# Patient Record
Sex: FEMALE | Race: AMERICAN INDIAN OR ALASKA NATIVE
[De-identification: names, ages, dates, MRNs, and addresses within clinical notes are randomized per-mention and may not be internally consistent; named-entity substitution may affect disease eponyms.]

---

## 2017-05-23 ENCOUNTER — HOSPITAL ENCOUNTER (OUTPATIENT)
Dept: HOSPITAL 5 - MAMMO | Age: 82
Discharge: HOME | End: 2017-05-23
Attending: INTERNAL MEDICINE
Payer: MEDICARE

## 2017-05-23 DIAGNOSIS — Z12.31: Primary | ICD-10-CM

## 2017-05-23 PROCEDURE — 77067 SCR MAMMO BI INCL CAD: CPT

## 2017-05-23 PROCEDURE — G0202 SCR MAMMO BI INCL CAD: HCPCS

## 2017-05-23 NOTE — MAMMOGRAPHY REPORT
Bilateral mammogram: Compared to 5/19/15. CAD study utilized.



Findings:



Predominance of adipose tissue bilaterally. No mass or 

microcalcification. Benign calcifications bilaterally. Benign axillary 

nodes. The there is 3 mm focal ill-defined circumscribed asymmetry 

density  upper left breast.



Impression:



3 mm focal circumscribed ill-defined asymmetry upper left breast. 

Recommend spot mag and sonographic examination. BI-RADS CATEGORY:  0 = 

Needs additional imaging evaluation



ACR BI-RADS MAMMOGRAPHIC CODES:

0 = Needs additional imaging evaluation; 1 = Negative; 2 = Benign; 3 = 

Probably benign; 4 = Suspicious; 5 = Malignant; 6 = Known biopsy-proven 

malignancy



COMMENT:

      1.   Dense breast tissue, i.e., adenosis, fibrocystic    

            changes, etc., may obscure an underlying neoplasm.

      2.   Approximately 10% of cancers are not detected with

            mammography.

      3.   A negative mammography report should not delay biopsy 

            if a clinically suspicious mass is present. COMMENT:

Patient follow-up letters are generated in [employer].



Impression:



Benign findings. Annual followup recommended or

## 2017-06-06 ENCOUNTER — HOSPITAL ENCOUNTER (OUTPATIENT)
Dept: HOSPITAL 5 - MAMMO | Age: 82
Discharge: HOME | End: 2017-06-06
Attending: INTERNAL MEDICINE
Payer: MEDICARE

## 2017-06-06 DIAGNOSIS — R92.8: Primary | ICD-10-CM

## 2017-06-06 DIAGNOSIS — E11.8: ICD-10-CM

## 2017-06-06 DIAGNOSIS — I10: ICD-10-CM

## 2017-06-06 PROCEDURE — 77066 DX MAMMO INCL CAD BI: CPT

## 2017-06-06 PROCEDURE — G0204 DX MAMMO INCL CAD BI: HCPCS

## 2017-06-06 PROCEDURE — 76642 ULTRASOUND BREAST LIMITED: CPT

## 2017-06-06 NOTE — MAMMOGRAPHY REPORT
Spot Compression magnification and 90degree lateral density upper left 

breast measuring approximate 4 mm in diameter followed by sonographic 

examination:



Findings:



On spot compression magnification view 90degree lateral density is 

visualized and has circumscribed margins. No microcalcification.

On sonographic examination hypoechoic well-circumscribed density 

measuring 0.5 in diameter is noted at 1:00 position 7 cm from nipple 

corresponding to the density seen on mammogram.



Impression:



Probably benign density. 6 month followup with mammogram and sonogram 

recommended. BI-RADS CATEGORY:  3 = Probably benign



ACR BI-RADS MAMMOGRAPHIC CODES:

0 = Needs additional imaging evaluation; 1 = Negative; 2 = Benign; 3 = 

Probably benign; 4 = Suspicious; 5 = Malignant; 6 = Known biopsy-proven 

malignancy



COMMENT:

      1.   Dense breast tissue, i.e., adenosis, fibrocystic 

            changes, etc., may obscure an underlying neoplasm.

      2.   Approximately 10% of cancers are not detected with

            mammography.

      3.   A negative mammography report should not delay biopsy 

            if a clinically suspicious mass is present.

## 2017-10-31 ENCOUNTER — HOSPITAL ENCOUNTER (OUTPATIENT)
Dept: HOSPITAL 5 - US | Age: 82
Discharge: HOME | End: 2017-10-31
Attending: INTERNAL MEDICINE
Payer: MEDICARE

## 2017-10-31 DIAGNOSIS — N63.20: Primary | ICD-10-CM

## 2017-10-31 NOTE — MAMMOGRAPHY REPORT
LEFT DIGITAL DIAGNOSTIC MAMMOGRAM with CAD: 10/31/17 10:44:00



CLINICAL: Follow-up of a probably benign asymmetry.



COMPARISON:06/06/16



FINDINGS: The breast is heterogeneously dense, which may obscure small 

masses.  The previously described upper outer asymmetry is a lymph node 

with a fatty hilum.  The fatty hilum is more prominent than on prior 

exams.  No mass, architectural distortion or suspicious calcifications. 

 







IMPRESSION: No mammographic evidence of malignancy.A benign upper outer 

intraparenchymal lymph node and no need for breast ultrasound.



BI-RADS CATEGORY:  2 -- Benign



RECOMMENDATION: Return to routine mammographic screening.



ACR BI-RADS MAMMOGRAPHIC CODES:

0 = Needs additional imaging evaluation; 1 = Negative; 2 = Benign; 3 = 

Probably benign; 4 = Suspicious; 5 = Malignant; 6 = Known biopsy-proven 

malignancy



COMMENT:

      1.   Dense breast tissue, i.e., adenosis, fibrocystic 

            changes, etc., may obscure an underlying neoplasm.

      2.   Approximately 10% of cancers are not detected with

            mammography.

      3.   A negative mammography report should not delay biopsy 

            if a clinically suspicious mass is present.





COMMENT:

Patient follow-up letters are generated by our  [employer] application.

## 2018-05-25 ENCOUNTER — HOSPITAL ENCOUNTER (OUTPATIENT)
Dept: HOSPITAL 5 - SPVWC | Age: 83
Discharge: HOME | End: 2018-05-25
Attending: SURGERY
Payer: MEDICARE

## 2018-05-25 DIAGNOSIS — E78.00: ICD-10-CM

## 2018-05-25 DIAGNOSIS — I10: ICD-10-CM

## 2018-05-25 DIAGNOSIS — Z90.710: ICD-10-CM

## 2018-05-25 DIAGNOSIS — Z12.31: Primary | ICD-10-CM

## 2018-05-25 DIAGNOSIS — J45.909: ICD-10-CM

## 2018-05-25 PROCEDURE — 77067 SCR MAMMO BI INCL CAD: CPT

## 2018-05-26 NOTE — MAMMOGRAPHY REPORT
Screening mammogram:



Routine views are compared to multiple exams dating back to 2014. The 

left breast pattern has remained stable. No significant findings. The 

patient has had right breast surgery. There is a focal asymmetry in the 

superior right breast which is slightly more prominent than on prior 

exam in 2017 and not seen prior to that. The findings in the right 

breast are not otherwise changed or unremarkable from prior exams.



CAD used.



Impression:



Right breast asymmetry.



Recommendation:



Additional spot compression imaging of the right breast and ultrasound 

as needed.



BI-RADS CATEGORY:  0 = Needs additional imaging evaluation



ACR BI-RADS MAMMOGRAPHIC CODES:

0 = Needs additional imaging evaluation; 1 = Negative; 2 = Benign; 3 = 

Probably benign; 4 = Suspicious; 5 = Malignant; 6 = Known biopsy-proven 

malignancy



COMMENT:

      1.   Dense breast tissue, i.e., adenosis, fibrocystic    

            changes, etc., may obscure an underlying neoplasm.

      2.   Approximately 10% of cancers are not detected with

            mammography.

      3.   A negative mammography report should not delay biopsy 

            if a clinically suspicious mass is present.

## 2018-06-12 ENCOUNTER — HOSPITAL ENCOUNTER (OUTPATIENT)
Dept: HOSPITAL 5 - SPVWC | Age: 83
Discharge: HOME | End: 2018-06-12
Attending: SURGERY
Payer: MEDICARE

## 2018-06-12 DIAGNOSIS — Z90.710: ICD-10-CM

## 2018-06-12 DIAGNOSIS — I10: ICD-10-CM

## 2018-06-12 DIAGNOSIS — N60.41: ICD-10-CM

## 2018-06-12 DIAGNOSIS — E11.9: ICD-10-CM

## 2018-06-12 DIAGNOSIS — N60.01: Primary | ICD-10-CM

## 2018-06-12 DIAGNOSIS — E78.00: ICD-10-CM

## 2018-06-12 NOTE — MAMMOGRAPHY REPORT
RIGHT DIGITAL DIAGNOSTIC MAMMOGRAM and RIGHT BREAST ULTRASOUND: 

06/12/18 09:32:00



CLINICAL: Recalled for asymmetry.



COMPARISON:05/25/18 screening



FINDINGS: Additional mammographic views were performed and are negative.



Ultrasound of the right breast (including all four quadrants and the 

retroareolar area) was performed and demonstrated a few dilated ducts 

and a few small cysts.  A 3 x 2 x 1 mm cyst at 10 o'clock 8 cm from the 

nipple, a 3 x 2 x 2 mm cyst at 12 o'clock 3 cm from the nipple and a 

subareolar cyst at 2 o'clock measuring 4 x 2 x 4 mm.  No solid mass or 

shadowing.





IMPRESSION: Mild benign duct ectasia and a few tiny benign cysts.



BI-RADS CATEGORY:  2 - - Benign



RECOMMENDATION: Routine mammographic screening in one year.



ACR BI-RADS MAMMOGRAPHIC CODES:

0 = Needs additional imaging evaluation; 1 = Negative; 2 = Benign; 3 = 

Probably benign; 4 = Suspicious; 5 = Malignant; 6 = Known biopsy-proven 

malignancy



COMMENT:

      1.   Dense breast tissue, i.e., adenosis, fibrocystic 

            changes, etc., may obscure an underlying neoplasm.

      2.   Approximately 10% of cancers are not detected with

            mammography.

      3.   A negative mammography report should not delay biopsy 

            if a clinically suspicious mass is present.





COMMENT:

Patient follow-up letters are generated via our [email or web address] 

application.

## 2018-06-12 NOTE — ULTRASOUND REPORT
RIGHT DIGITAL DIAGNOSTIC MAMMOGRAM and RIGHT BREAST ULTRASOUND: 

06/12/18 09:32:00



CLINICAL: Recalled for asymmetry.



COMPARISON:05/25/18 screening



FINDINGS: Additional mammographic views were performed and are negative.



Ultrasound of the right breast (including all four quadrants and the 

retroareolar area) was performed and demonstrated a few dilated ducts 

and a few small cysts.  A 3 x 2 x 1 mm cyst at 10 o'clock 8 cm from the 

nipple, a 3 x 2 x 2 mm cyst at 12 o'clock 3 cm from the nipple and a 

subareolar cyst at 2 o'clock measuring 4 x 2 x 4 mm.  No solid mass or 

shadowing.





IMPRESSION: Mild benign duct ectasia and a few tiny benign cysts.



BI-RADS CATEGORY:  2 - - Benign



RECOMMENDATION: Routine mammographic screening in one year.



ACR BI-RADS MAMMOGRAPHIC CODES:

0 = Needs additional imaging evaluation; 1 = Negative; 2 = Benign; 3 = 

Probably benign; 4 = Suspicious; 5 = Malignant; 6 = Known biopsy-proven 

malignancy



COMMENT:

      1.   Dense breast tissue, i.e., adenosis, fibrocystic 

            changes, etc., may obscure an underlying neoplasm.

      2.   Approximately 10% of cancers are not detected with

            mammography.

      3.   A negative mammography report should not delay biopsy 

            if a clinically suspicious mass is present.





COMMENT:

Patient follow-up letters are generated via our [employer] 

application.

## 2019-11-20 ENCOUNTER — HOSPITAL ENCOUNTER (EMERGENCY)
Dept: HOSPITAL 5 - ED | Age: 84
Discharge: HOME | End: 2019-11-20
Payer: COMMERCIAL

## 2019-11-20 VITALS — SYSTOLIC BLOOD PRESSURE: 158 MMHG | DIASTOLIC BLOOD PRESSURE: 63 MMHG

## 2019-11-20 DIAGNOSIS — Z87.442: ICD-10-CM

## 2019-11-20 DIAGNOSIS — I25.2: ICD-10-CM
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
## 2019-11-20 DIAGNOSIS — Z88.0: ICD-10-CM

## 2019-11-20 DIAGNOSIS — I10: ICD-10-CM

## 2019-11-20 DIAGNOSIS — Z79.899: ICD-10-CM
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
## 2019-11-20 DIAGNOSIS — S83.92XA: Primary | ICD-10-CM
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
## 2019-11-20 DIAGNOSIS — E78.00: ICD-10-CM

## 2019-11-20 DIAGNOSIS — Z98.890: ICD-10-CM

## 2019-11-20 DIAGNOSIS — Z90.710: ICD-10-CM

## 2019-11-20 DIAGNOSIS — E11.9: ICD-10-CM

## 2019-11-20 DIAGNOSIS — Z88.2: ICD-10-CM

## 2019-11-20 DIAGNOSIS — Z88.6: ICD-10-CM
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
## 2019-11-20 NOTE — XRAY REPORT
LEFT KNEE 3 VIEW(S)



INDICATION / CLINICAL INFORMATION:

PAIN/SWELLING R/T FALL



COMPARISON:

None available.

 

FINDINGS:



BONES / JOINT(S): No acute fracture or subluxation. Mild patellofemoral degenerative arthrosis. No si
gnificant joint effusion or intra-articular body.



SOFT TISSUES: Mild anterior soft tissue swelling.



ADDITIONAL FINDINGS: None.







Signer Name: JESSY Narayan MD 

Signed: 11/20/2019 5:12 PM

 Workstation Name: [id number]

## 2019-11-20 NOTE — EMERGENCY DEPARTMENT REPORT
ED Extremity Problem HPI





- General


Chief complaint: Pain General


Stated complaint: LT KNEE INJURY


Time Seen by Provider: 19 17:50


Source: patient


Mode of arrival: Ambulatory


Limitations: No Limitations





- History of Present Illness


Initial comments: 





THis is a pleasant 87 yo AAF who presents to the Ed with a chief complaint of 

left knee pain after a fall one week prior. She reports she was walking in her 

bathroom and slipped on wet tile and while falling she twisted her left knee. 

She denies any other injuries. NO head injury or LOC. No syncope, pre syncope, 

chest pain, SOB, weakness, fever, chills, night sweats or any other related 

symptoms. Pain is a 8/10 with movement. immobility alleviates the pain. no 

radaiteing pain. Pain is located to the lateral left knee. 


MD Complaint: extremity pain


Onset/Timin


-: Gradual, week(s)


Location: lower extremity


-: No myalgia, No fever, No associated dyspnea, No associated chest pain


Quality: stabbing, sharp


Consistency: intermittent


Improves with: immobilization


Worsens with: weight bearing, walking





- Related Data


                                Home Medications











 Medication  Instructions  Recorded  Confirmed  Last Taken


 


Aspirin [Aspirin BABY CHEW TAB] 162 mg PO QDAY 08/26/14 10/15/18 Unknown


 


Simvastatin 20 mg PO QDAY 08/26/14 10/15/18 Unknown


 


Sitagliptin Phosphate [Januvia] 100 mg PO DAILY 08/26/14 10/15/18 Unknown








                                  Previous Rx's











 Medication  Instructions  Recorded  Last Taken  Type


 


Ciprofloxacin [Ciprofloxacin ORAL 500 mg PO BID #8 [facility]..rec 10/16/18 Unknown 

Rx





LIQ]    


 


amLODIPine 5 mg PO QDAY #30 tablet 10/16/18 Unknown Rx


 


amLODIPine 10 mg PO DAILY #30 tab 10/16/18 Unknown Rx


 


ALBUTEROL Inhaler(NF) [VENTOLIN 1 puff IH Q4HR PRN #1 inha 18 Unknown Rx





Inhaler(NF)]    


 


Benzonatate [Tessalon Perles] 100 mg PO Q8HR PRN #20 capsule 18 Unknown Rx


 


Meloxicam [Mobic] 7.5 mg PO QDAY 7 Days #7 tablet 19 Unknown Rx











                                    Allergies











Allergy/AdvReac Type Severity Reaction Status Date / Time


 


Penicillins Allergy  Swelling Verified 14 23:30


 


Sulfa (Sulfonamide Allergy  Rash Verified 14 23:30





Antibiotics)     


 


tramadol Allergy  Rash Unverified 16 11:15














ED Review of Systems


ROS: 


Stated complaint: LT KNEE INJURY


Other details as noted in HPI





Comment: All other systems reviewed and negative


Constitutional: denies: chills, fever


Eyes: denies: eye pain, eye discharge, vision change


ENT: denies: ear pain, throat pain


Respiratory: denies: cough, shortness of breath, wheezing


Cardiovascular: denies: chest pain, palpitations


Endocrine: no symptoms reported


Gastrointestinal: denies: abdominal pain, nausea, diarrhea


Genitourinary: denies: urgency, dysuria, discharge


Musculoskeletal: as per HPI, arthralgia.  denies: back pain, joint swelling


Skin: denies: rash, lesions


Neurological: denies: headache, weakness, paresthesias


Psychiatric: denies: anxiety, depression


Hematological/Lymphatic: denies: easy bleeding, easy bruising





ED Past Medical Hx





- Past Medical History


Previous Medical History?: Yes


Hx Hypertension: Yes


Hx Heart Attack/AMI: No


Hx Diabetes: Yes


Hx Kidney Stones: Yes


Hx Asthma: Yes


Additional medical history: high cholesterol, cyst on kidney





- Surgical History


Past Surgical History?: Yes


Additional Surgical History: Hysterectomy, thyroid surgery, surgery to left toe,

 left foot, left elbow, left hand, intra Ductal Papilloma, Dysplasia under 

tongue, cataract surgery, vein stripping, parathyroidectomy





- Social History


Smoking Status: Never Smoker


Substance Use Type: None





- Medications


Home Medications: 


                                Home Medications











 Medication  Instructions  Recorded  Confirmed  Last Taken  Type


 


Aspirin [Aspirin BABY CHEW TAB] 162 mg PO QDAY 08/26/14 10/15/18 Unknown History


 


Simvastatin 20 mg PO QDAY 08/26/14 10/15/18 Unknown History


 


Sitagliptin Phosphate [Januvia] 100 mg PO DAILY 08/26/14 10/15/18 Unknown 

History


 


Ciprofloxacin [Ciprofloxacin ORAL 500 mg PO BID #8 [facility]..rec 10/16/18  Unknown 

Rx





LIQ]     


 


amLODIPine 5 mg PO QDAY #30 tablet 10/16/18  Unknown Rx


 


amLODIPine 10 mg PO DAILY #30 tab 10/16/18  Unknown Rx


 


ALBUTEROL Inhaler(NF) [VENTOLIN 1 puff IH Q4HR PRN #1 inha 18  Unknown Rx





Inhaler(NF)]     


 


Benzonatate [Tessalon Perles] 100 mg PO Q8HR PRN #20 capsule 18  Unknown 

Rx


 


Meloxicam [Mobic] 7.5 mg PO QDAY 7 Days #7 tablet 19  Unknown Rx














ED Physical Exam





- General


Limitations: No Limitations


General appearance: alert, in no apparent distress





- Head


Head exam: Present: atraumatic, normocephalic





- Eye


Eye exam: Present: normal appearance





- ENT


ENT exam: Present: mucous membranes moist





- Neck


Neck exam: Present: normal inspection





- Respiratory


Respiratory exam: Present: normal lung sounds bilaterally.  Absent: respiratory 

distress





- Cardiovascular


Cardiovascular Exam: Present: regular rate, normal rhythm.  Absent: systolic 

murmur, diastolic murmur, rubs, gallop





- GI/Abdominal


GI/Abdominal exam: Present: soft, normal bowel sounds





- Extremities Exam


Extremities exam: Present: normal inspection, tenderness





- Expanded Lower Extremity Exam


  ** Left


Knee exam: Present: normal inspection, full ROM, tenderness (TTP over lateral 

left knee with pain on varus strain, no pain with valgus strain, no laxity with 

either, negatie anterior/posterior drawer sign. no posterior calf tenderness, 

negative priscilla sign bilaterally. normal distal sensation and cap refill. normal 

DP/PT pulses. )





- Back Exam


Back exam: Present: normal inspection





- Neurological Exam


Neurological exam: Present: alert, oriented X3





- Psychiatric


Psychiatric exam: Present: normal affect, normal mood





- Skin


Skin exam: Present: warm, dry, intact, normal color.  Absent: rash





ED Course





                                   Vital Signs











  19





  15:43


 


Temperature 97.9 F


 


Pulse Rate 67


 


Respiratory 18





Rate 


 


Blood Pressure 140/59


 


O2 Sat by Pulse 100





Oximetry 














ED Medical Decision Making





- Radiology Data


Radiology results: report reviewed, image reviewed





LEFT KNEE 3 VIEW(S) INDICATION / CLINICAL INFORMATION: PAIN/SWELLING R/T FALL 

COMPARISON: None available. FINDINGS: BONES / JOINT(S): No acute fracture or 

subluxation. Mild patellofemoral degenerative arthrosis. No significant joint 

effusion or intra-articular body. SOFT TISSUES: Mild anterior soft tissue 

swelling. ADDITIONAL FINDINGS: None. Signer Name: JESSY Narayan MD Signed: 

2019 5:12 PM Workstation Name: [id number] Transcribed By: DT Dictated By:

 Amandeep Narayan MD Electronically Authenticated By: Amandeep Narayan MD 

Signed Date/Time: 19 171 





- Medical Decision Making





patient presented with left knee pain after twisting injury. x-ray is negative 

for fractures or acute bony abnormalities as read by radiology. I suspect LCL 

injury due to pain with varus strain of the knee. No laxity on exam. ACE 

banadage applied by RN and supervised by me. Normal neuro and vascular exam pre 

and post application. patient given follow up wiht orthpedics and return 

precautions for changing or worsening symptoms. Low wells score for DVT and no 

symptoms of DVT on exam. No additional injuries. 





- Differential Diagnosis


fracture, sprain, strain 


Critical care attestation.: 


If time is entered above; I have spent that time in minutes in the direct care 

of this critically ill patient, excluding procedure time.








ED Disposition


Clinical Impression: 


Left knee sprain


Qualifiers:


 Encounter type: initial encounter Involved ligament of knee: lateral collateral

 ligament Qualified Code(s): S83.422A - Sprain of lateral collateral ligament of

 left knee, initial encounter





Disposition:  TO HOME OR SELFCARE


Is pt being admited?: No


Does the pt Need Aspirin: No


Condition: Stable


Instructions:  Knee Sprain (ED)


Prescriptions: 


Meloxicam [Mobic] 7.5 mg PO QDAY 7 Days #7 tablet


Referrals: 


REX LOWE MD [Staff Physician] - 3-5 Days


Time of Disposition: 18:45

## 2021-02-22 ENCOUNTER — HOSPITAL ENCOUNTER (OUTPATIENT)
Dept: HOSPITAL 5 - ED | Age: 86
Setting detail: OBSERVATION
LOS: 1 days | Discharge: HOME | End: 2021-02-23
Attending: INTERNAL MEDICINE | Admitting: INTERNAL MEDICINE
Payer: MEDICARE

## 2021-02-22 DIAGNOSIS — N17.9: ICD-10-CM

## 2021-02-22 DIAGNOSIS — Z79.82: ICD-10-CM

## 2021-02-22 DIAGNOSIS — Z87.442: ICD-10-CM

## 2021-02-22 DIAGNOSIS — E87.6: ICD-10-CM

## 2021-02-22 DIAGNOSIS — I20.0: Primary | ICD-10-CM

## 2021-02-22 DIAGNOSIS — Z90.710: ICD-10-CM

## 2021-02-22 DIAGNOSIS — Z98.890: ICD-10-CM

## 2021-02-22 DIAGNOSIS — E11.22: ICD-10-CM

## 2021-02-22 DIAGNOSIS — I12.9: ICD-10-CM

## 2021-02-22 DIAGNOSIS — Z98.49: ICD-10-CM

## 2021-02-22 DIAGNOSIS — N18.32: ICD-10-CM

## 2021-02-22 DIAGNOSIS — J45.909: ICD-10-CM

## 2021-02-22 DIAGNOSIS — R07.89: ICD-10-CM

## 2021-02-22 LAB
ALBUMIN SERPL-MCNC: 4.3 G/DL (ref 3.9–5)
ALT SERPL-CCNC: 13 UNITS/L (ref 7–56)
APTT BLD: 27.1 SEC. (ref 24.2–36.6)
BAND NEUTROPHILS # (MANUAL): 0 K/MM3
BUN SERPL-MCNC: 32 MG/DL (ref 7–17)
BUN/CREAT SERPL: 18 %
CALCIUM SERPL-MCNC: 10.8 MG/DL (ref 8.4–10.2)
HCT VFR BLD CALC: 38.3 % (ref 30.3–42.9)
HEMOLYSIS INDEX: 5
HGB BLD-MCNC: 12.6 GM/DL (ref 10.1–14.3)
INR PPP: 0.94 (ref 0.87–1.13)
MCHC RBC AUTO-ENTMCNC: 33 % (ref 30–34)
MCV RBC AUTO: 105 FL (ref 79–97)
MYELOCYTES # (MANUAL): 0 K/MM3
PLATELET # BLD: 227 K/MM3 (ref 140–440)
PROMYELOCYTES # (MANUAL): 0 K/MM3
RBC # BLD AUTO: 3.64 M/MM3 (ref 3.65–5.03)
TOTAL CELLS COUNTED BLD: 100

## 2021-02-22 PROCEDURE — 36415 COLL VENOUS BLD VENIPUNCTURE: CPT

## 2021-02-22 PROCEDURE — 85025 COMPLETE CBC W/AUTO DIFF WBC: CPT

## 2021-02-22 PROCEDURE — G0378 HOSPITAL OBSERVATION PER HR: HCPCS

## 2021-02-22 PROCEDURE — 82550 ASSAY OF CK (CPK): CPT

## 2021-02-22 PROCEDURE — 82553 CREATINE MB FRACTION: CPT

## 2021-02-22 PROCEDURE — 83880 ASSAY OF NATRIURETIC PEPTIDE: CPT

## 2021-02-22 PROCEDURE — 85007 BL SMEAR W/DIFF WBC COUNT: CPT

## 2021-02-22 PROCEDURE — 85610 PROTHROMBIN TIME: CPT

## 2021-02-22 PROCEDURE — 96372 THER/PROPH/DIAG INJ SC/IM: CPT

## 2021-02-22 PROCEDURE — 96375 TX/PRO/DX INJ NEW DRUG ADDON: CPT

## 2021-02-22 PROCEDURE — 71046 X-RAY EXAM CHEST 2 VIEWS: CPT

## 2021-02-22 PROCEDURE — 80048 BASIC METABOLIC PNL TOTAL CA: CPT

## 2021-02-22 PROCEDURE — 80053 COMPREHEN METABOLIC PANEL: CPT

## 2021-02-22 PROCEDURE — 93017 CV STRESS TEST TRACING ONLY: CPT

## 2021-02-22 PROCEDURE — 85730 THROMBOPLASTIN TIME PARTIAL: CPT

## 2021-02-22 PROCEDURE — 97165 OT EVAL LOW COMPLEX 30 MIN: CPT

## 2021-02-22 PROCEDURE — 93005 ELECTROCARDIOGRAM TRACING: CPT

## 2021-02-22 PROCEDURE — 84484 ASSAY OF TROPONIN QUANT: CPT

## 2021-02-22 PROCEDURE — 99291 CRITICAL CARE FIRST HOUR: CPT

## 2021-02-22 PROCEDURE — A9502 TC99M TETROFOSMIN: HCPCS

## 2021-02-22 PROCEDURE — 96374 THER/PROPH/DIAG INJ IV PUSH: CPT

## 2021-02-22 PROCEDURE — 97161 PT EVAL LOW COMPLEX 20 MIN: CPT

## 2021-02-22 PROCEDURE — 78452 HT MUSCLE IMAGE SPECT MULT: CPT

## 2021-02-22 NOTE — EVENT NOTE
ED Screening Note


ED Screening Note: 





left sided CP that began a couple days ago 


states it feels like a pressure


no n/v/d


no fever


no cough


no SOB 


+leg swelling 


no diaphoresis


no radiation of the pain 


PMHx HTN, osteoporesis, HLD, pre-diabetes 


allergy: pencillin, sulfa, tramadol 


never been a smoker





This initial assessment/diagnostic orders/clinical plan/treatment(s) is/are 

subject to change based on patients health status, clinical progression and re-

assessment by fellow clinical providers in the ED. Further treatment and workup 

at subsequent clinical providers discretion. Patient/guardian urged not to elope

from the ED as their condition may be serious if not clinically assessed and 

managed. 





Initial orders include: 


CP protocol

## 2021-02-22 NOTE — EMERGENCY DEPARTMENT REPORT
ED Chest Pain HPI





- General


Chief Complaint: Chest Pain


Stated Complaint: CHEST PAIN


Time Seen by Provider: 02/22/21 21:23


Source: patient


Mode of arrival: Ambulatory


Limitations: No Limitations





- History of Present Illness


Initial Comments: 





Patient is 87 years old female with history of hypertension, diabetes and 

hyperlipidemia.  Patient presented to the ER complaining of left sided chest 

pain, tightness, 4 out of 10 with no radiation.  No relieving or aggravating 

factors.  Patient stated that she never had any pain like this before.  Patient 

denied any shortness of breath, fever, chills or cough.


MD Complaint: chest pain


-: days(s)


Onset: during rest


Pain Location: left chest


Severity scale (0 -10): 4


Quality: tightness





- Related Data


                                Home Medications











 Medication  Instructions  Recorded  Confirmed  Last Taken


 


Aspirin [Aspirin BABY CHEW TAB] 162 mg PO QDAY 08/26/14 10/15/18 Unknown


 


Simvastatin 20 mg PO QDAY 08/26/14 10/15/18 Unknown


 


Sitagliptin Phosphate [Januvia] 100 mg PO DAILY 08/26/14 10/15/18 Unknown








                                  Previous Rx's











 Medication  Instructions  Recorded  Last Taken  Type


 


Ciprofloxacin [Ciprofloxacin ORAL 500 mg PO BID #8 [facility]..rec 10/16/18 Unknown 

Rx





LIQ]    


 


amLODIPine 5 mg PO QDAY #30 tablet 10/16/18 Unknown Rx


 


amLODIPine 10 mg PO DAILY #30 tab 10/16/18 Unknown Rx


 


ALBUTEROL Inhaler(NF) [VENTOLIN 1 puff IH Q4HR PRN #1 inha 12/29/18 Unknown Rx





Inhaler(NF)]    


 


Benzonatate [Tessalon Perles] 100 mg PO Q8HR PRN #20 capsule 12/29/18 Unknown Rx


 


Meloxicam [Mobic] 7.5 mg PO QDAY 7 Days #7 tablet 11/20/19 Unknown Rx











                                    Allergies











Allergy/AdvReac Type Severity Reaction Status Date / Time


 


Penicillins Allergy  Swelling Verified 04/28/14 23:30


 


Sulfa (Sulfonamide Allergy  Rash Verified 04/28/14 23:30





Antibiotics)     


 


tramadol Allergy  Rash Unverified 05/19/16 11:15














Heart Score





- HEART Score


History: Moderately suspicious


EKG: Non-specific


Age: > 65


Risk factors: > 3 risk factors or hx of atherosclerotic disease


Troponin: < normal limit


HEART Score: 6





ED Review of Systems


ROS: 


Stated complaint: CHEST PAIN


Other details as noted in HPI





Comment: All other systems reviewed and negative


Constitutional: denies: chills, fever


Respiratory: denies: cough, shortness of breath, SOB with exertion, SOB at rest,

 wheezing


Cardiovascular: chest pain


Gastrointestinal: denies: abdominal pain, nausea, vomiting, diarrhea, 

constipation, hematemesis, melena, hematochezia


Musculoskeletal: denies: back pain


Neurological: denies: headache, weakness, numbness, paresthesias, confusion





ED Past Medical Hx





- Past Medical History


Hx Hypertension: Yes


Hx Heart Attack/AMI: No


Hx Diabetes: Yes


Hx Kidney Stones: Yes


Hx Asthma: Yes


Additional medical history: high cholesterol, cyst on kidney





- Surgical History


Additional Surgical History: Hysterectomy, thyroid surgery, surgery to left toe,

 left foot, left elbow, left hand, intra Ductal Papilloma, Dysplasia under 

tongue, cataract surgery, vein stripping, parathyroidectomy





- Social History


Smoking Status: Never Smoker


Substance Use Type: None





- Medications


Home Medications: 


                                Home Medications











 Medication  Instructions  Recorded  Confirmed  Last Taken  Type


 


Aspirin [Aspirin BABY CHEW TAB] 162 mg PO QDAY 08/26/14 10/15/18 Unknown History


 


Simvastatin 20 mg PO QDAY 08/26/14 10/15/18 Unknown History


 


Sitagliptin Phosphate [Januvia] 100 mg PO DAILY 08/26/14 10/15/18 Unknown Histor

y


 


Ciprofloxacin [Ciprofloxacin ORAL 500 mg PO BID #8 [facility]..rec 10/16/18  Unknown 

Rx





LIQ]     


 


amLODIPine 5 mg PO QDAY #30 tablet 10/16/18  Unknown Rx


 


amLODIPine 10 mg PO DAILY #30 tab 10/16/18  Unknown Rx


 


ALBUTEROL Inhaler(NF) [VENTOLIN 1 puff IH Q4HR PRN #1 inha 12/29/18  Unknown Rx





Inhaler(NF)]     


 


Benzonatate [Tessalon Perles] 100 mg PO Q8HR PRN #20 capsule 12/29/18  Unknown 

Rx


 


Meloxicam [Mobic] 7.5 mg PO QDAY 7 Days #7 tablet 11/20/19  Unknown Rx














ED Physical Exam





- General


Limitations: No Limitations


General appearance: alert, in no apparent distress





- Head


Head exam: Present: atraumatic, normocephalic, normal inspection





- Eye


Eye exam: Present: normal appearance





- ENT


ENT exam: Present: normal exam, normal orophraynx, mucous membranes moist





- Neck


Neck exam: Present: normal inspection, full ROM.  Absent: tenderness, m

eningismus





- Respiratory


Respiratory exam: Present: normal lung sounds bilaterally





- Cardiovascular


Cardiovascular Exam: Present: regular rate, normal rhythm, normal heart sounds





- GI/Abdominal


GI/Abdominal exam: Present: soft, normal bowel sounds.  Absent: distended, 

tenderness, guarding, rebound, rigid, organomegaly, mass, bruit, pulsatile mass,

 hernia





- Extremities Exam


Extremities exam: Present: normal inspection, full ROM, normal capillary refill.

  Absent: tenderness, pedal edema, joint swelling, calf tenderness





- Back Exam


Back exam: Present: normal inspection, full ROM.  Absent: CVA tenderness (R), 

CVA tenderness (L)





- Neurological Exam


Neurological exam: Present: alert, oriented X3, CN II-XII intact, normal gait, 

reflexes normal





- Psychiatric


Psychiatric exam: Present: normal mood





- Skin


Skin exam: Present: warm, intact, normal color





ED Course





                                   Vital Signs











  02/22/21





  21:19


 


Temperature 98.0 F


 


Pulse Rate 64


 


Respiratory 14





Rate 


 


Blood Pressure 184/56


 


O2 Sat by Pulse 97





Oximetry 














GUIDO score





- Guido Score


Age > 65: (1) Yes


Aspirin use within the Past 7 Days: (1) Yes


3 or more CAD Risk Factors: (1) Yes


2 or more Angina events in past 24 hrs: (1) Yes


Known CAD with more than 50% Stenosis: (0) No


Elevated Cardiac Markers: (0) No


ST Deviation Greater than 0.5mm: (0) No


GUIDO Score: 4





ED Medical Decision Making





- Lab Data


Result diagrams: 


                                 02/22/21 21:27





                                 02/22/21 21:27





- EKG Data


EKG shows normal: sinus rhythm





- Radiology Data


Radiology results: report reviewed





- Medical Decision Making





Patient is 87 years old female with history of hypertension, diabetes and 

hyperlipidemia.  Patient presented to the ER complaining of left sided chest 

pain, tightness, 4 out of 10 with no radiation.  No relieving or aggravating 

factors.  Patient stated that she never had any pain like this before.  Patient 

denied any shortness of breath, fever, chills or cough.





EKG is unremarkable.  Labs reviewed and is unremarkable including first set of 

troponin.  Chest x-ray showed no acute abnormalities.  Patient chest pain is 

typical.  Patient received aspirin 324 mg in the ER.  I discussed the patient 

with Dr. Temple, he agreed to admit the patient to medical service for further 

management.


Critical Care Time: Yes


Critical care time in (mins) excluding proc time.: 30


Critical care attestation.: 


If time is entered above; I have spent that time in minutes in the direct care 

of this critically ill patient, excluding procedure time.








ED Disposition


Clinical Impression: 


 Unstable angina





Disposition: DC-09 OP ADMIT IP TO THIS HOSP


Is pt being admited?: Yes


Condition: Stable


Instructions:  Angina (ED)


Referrals: 


PRIMARY CARE,MD [Primary Care Provider] - 3-5 Days

## 2021-02-22 NOTE — XRAY REPORT
CHEST 2 VIEWS 



INDICATION / CLINICAL INFORMATION: Chest Pain.



COMPARISON: None available.



FINDINGS:



SUPPORT DEVICES: None.

Lungs: Lungs are slightly hyperinflated. Mild increased density seen in the left lower lung overlying
 left hemidiaphragm. 



Signer Name: Pb Valdez MD 

Signed: 2/22/2021 10:04 PM

Workstation Name: [id number]

## 2021-02-23 VITALS — SYSTOLIC BLOOD PRESSURE: 136 MMHG | DIASTOLIC BLOOD PRESSURE: 53 MMHG

## 2021-02-23 LAB
BUN SERPL-MCNC: 29 MG/DL (ref 7–17)
BUN/CREAT SERPL: 18 %
CALCIUM SERPL-MCNC: 9.8 MG/DL (ref 8.4–10.2)
HEMOLYSIS INDEX: 2

## 2021-02-23 RX ADMIN — HEPARIN SODIUM SCH UNIT: 5000 INJECTION, SOLUTION INTRAVENOUS; SUBCUTANEOUS at 09:35

## 2021-02-23 RX ADMIN — HEPARIN SODIUM SCH UNIT: 5000 INJECTION, SOLUTION INTRAVENOUS; SUBCUTANEOUS at 01:51

## 2021-02-23 RX ADMIN — NITROGLYCERIN SCH INCH: 20 OINTMENT TOPICAL at 06:31

## 2021-02-23 RX ADMIN — NITROGLYCERIN SCH INCH: 20 OINTMENT TOPICAL at 09:33

## 2021-02-23 NOTE — CONSULTATION
History of Present Illness





- Reason for Consult


Consult date: 02/23/21


acute renal failure, chronic renal failure


Requesting physician: AMY J KOCHERLA





- History of Present Illness





This is a 88 yo F with past medical history of Hypertension, Diabetes, 

hyperlipidemia, who presents to [facility] with complaints of pressure-like chest 

pain, not associated with shortness of breath, nausea and vomiting, diaphoresis.

Chest pain was somewhat relieved by SL nitro/IV morphine. Patients was admitted 

for further cardiac work up. Labs showed elevated BUN/Cr at 32/1.8mg/dl along 

with mild hypokalemia with K at 3.2, for which renal consult is requested. Based

on chart review baseline Cr was around 1.2-.1.6mg/dl in the past. pt denies 

recent NSAIDs use or IV contrast exposure. 








Past History


Past Medical History: diabetes, hypertension, other (KIDNEY STONE, ASTHMA)


Past Surgical History: thyroidectomy, hysterectomy, Other (LEFT TOE & LEFT FOOT 

SURGERY LEFT ELBOW SURGERY, CATARACT SURGERY, PARATHYROID)


Social history: no significant social history


Family history: no significant family history





Medications and Allergies


                                    Allergies











Allergy/AdvReac Type Severity Reaction Status Date / Time


 


Penicillins Allergy  Swelling Verified 04/28/14 23:30


 


Sulfa (Sulfonamide Allergy  Rash Verified 04/28/14 23:30





Antibiotics)     


 


tramadol Allergy  Rash Verified 02/23/21 01:43











                                Home Medications











 Medication  Instructions  Recorded  Confirmed  Last Taken  Type


 


Simvastatin 40 mg PO QDAY 08/26/14 02/23/21 Unknown History


 


Calcium Carbonate [Calcium] 1,000 units PO DAILY 02/23/21 02/23/21 Unknown 

History


 


Cholecalciferol (Vitamin D3) 2,000 unit PO QDAY 02/23/21 02/23/21 Unknown 

History





[Vitamin D3 2,000 UNIT CAP]     


 


Hydralazine HCl 50 mg PO BID 02/23/21 02/23/21 Unknown History


 


Losartan [Cozaar] 100 mg PO QDAY 02/23/21 02/23/21 Unknown History


 


Metoprolol [Lopressor TAB] 50 mg PO DAILY 02/23/21 02/23/21 Unknown History


 


Montelukast [Singulair] 10 mg PO QPM 02/23/21 02/23/21 Unknown History


 


Pantoprazole [Protonix TAB] 20 mg PO QDAY #14 tablet.dr 02/23/21  Unknown Rx


 


Potassium Chloride [Klor-Con 8] 20 meq PO QDAY 02/23/21 02/23/21 Unknown History


 


Sitagliptin Phosphate [Januvia] 100 mg PO DAILY 02/23/21 02/23/21 Unknown 

History











Active Meds: 


Active Medications





Acetaminophen (Acetaminophen 325 Mg Tab)  650 mg PO Q4H PRN


   PRN Reason: Pain, Mild (1-3)


Aspirin (Aspirin 325 Mg Tab)  325 mg PO QDAY [facility]


   Last Admin: 02/23/21 09:33 Dose:  325 mg


   Documented by: 


Heparin Sodium (Porcine) (Heparin 5,000 Unit/1 Ml Vial)  5,000 unit SUB-Q Q12HR 

[facility]


   Last Admin: 02/23/21 09:35 Dose:  5,000 unit


   Documented by: 


Hydralazine HCl (Hydralazine 25 Mg Tab)  50 mg PO BID [facility]


Metoprolol Tartrate (Metoprolol Tartrate 50 Mg Tab)  50 mg PO DAILY [facility]


Morphine Sulfate (Morphine 2 Mg/1 Ml Inj)  2 mg IV Q3H PRN


   PRN Reason: Pain, Moderate (4-6)


Nitroglycerin (Nitroglycerin 2% Oint 1 Gm)  1 inch TP QIDNTG [facility]; Protocol


   Last Admin: 02/23/21 09:33 Dose:  1 inch


   Documented by: 


Nitroglycerin (Nitroglycerin 0.4 Mg Tab Subl)  0.4 mg SL .Q5MIN PRN


   PRN Reason: Chest Pain


Ondansetron HCl (Ondansetron 4 Mg/2 Ml Inj)  4 mg IV Q8H PRN


   PRN Reason: Nausea And Vomiting


Pravastatin Sodium (Pravastatin 80 Mg Tab)  80 mg PO QHS [facility]











Review of Systems


All systems: negative


Constitutional: weakness


Cardiovascular: chest pain





Exam





- Vital Signs


Vital signs: 


                                   Vital Signs











Temp Pulse Resp BP Pulse Ox


 


 98.0 F   64   14   184/56   97 


 


 02/22/21 21:19  02/22/21 21:19  02/22/21 21:19  02/22/21 21:19  02/22/21 21:19














- General Appearance


General appearance: well-developed, well-nourished, appears stated age


EENT: ATNC, PERRL, mucous membranes moist


Neck: Present: neck supple


Respiratory: Clear to Ascultation


Heart: regular, S1S2


Gastrointestinal: Present: normoactive bowel sounds


Integumentary: no rash


Neurologic: no focal deficit, alert and oriented x3, strength 5/5, CN 3-12 

intact


Psychiatric: mood/affect appropriate, cooperative





Results





- Lab Results





                                 02/22/21 21:27





                                 02/23/21 10:33


                             Most recent lab results











Calcium  9.8 mg/dL (8.4-10.2)   02/23/21  10:33    














Assessment and Plan





- Patient Problems


(1) Chronic kidney disease, stage 3b


Current Visit: Yes   Status: Acute   


Plan to address problem: 


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

 senescence. Current renal function is not far from her baseline. No further 

work up required at this time. K Supplementation with K Dur ongoing. Cont 

supportive care for CKD avoid nephrotoxins, NSAIDS IV contrast. 








(2) Chest pain


Current Visit: Yes   Status: Acute   


Plan to address problem: 


atypical chest pain, trop negative x 3, pending stress test 











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 

kidney disease, or unspecified chronic kidney disease


Current Visit: Yes   Status: Acute   


Plan to address problem: 


monitor BP on current meds. currently at target 








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


Current Visit: Yes   Status: Acute   


Plan to address problem: 


diabetes management as per primary attending








(5) Hypokalemia


Current Visit: Yes   Status: Acute   


Plan to address problem: 


K Supplementation with K Dur ongoing, to target K >4

## 2021-02-23 NOTE — HISTORY AND PHYSICAL REPORT
History of Present Illness


Date of examination: 02/22/21


Date of admission: 


02/22/21 23:13





Chief complaint: 





Chief complaint is chest pain


History of present illness: 





History of presenting illness, patient is an 87-year-old female who says she has

been having pressure-like chest pain located in the precordial area and going on

for 2 days.  Pain does not radiate and is not associated with shortness of 

breath, nausea and vomiting, diaphoresis, patient said she has never had such 

pain before and pain is not affected by movement or breathing.





Past History


Past Medical History: diabetes, hypertension, other (KIDNEY STONE, ASTHMA)


Past Surgical History: thyroidectomy, hysterectomy, Other (LEFT TOE & LEFT FOOT 

SURGERY LEFT ELBOW SURGERY, CATARACT SURGERY, PARATHYROID)


Social history: no significant social history


Family history: no significant family history





Medications and Allergies


                                    Allergies











Allergy/AdvReac Type Severity Reaction Status Date / Time


 


Penicillins Allergy  Swelling Verified 04/28/14 23:30


 


Sulfa (Sulfonamide Allergy  Rash Verified 04/28/14 23:30





Antibiotics)     


 


tramadol Allergy  Rash Verified 02/23/21 01:43











                                Home Medications











 Medication  Instructions  Recorded  Confirmed  Last Taken  Type


 


Simvastatin 40 mg PO QDAY 08/26/14 02/23/21 Unknown History


 


Calcium Carbonate [Calcium] 1,000 units PO DAILY 02/23/21 02/23/21 Unknown 

History


 


Cholecalciferol (Vitamin D3) 2,000 unit PO QDAY 02/23/21 02/23/21 Unknown 

History





[Vitamin D3 2,000 UNIT CAP]     


 


Hydralazine HCl 50 mg PO BID 02/23/21 02/23/21 Unknown History


 


Losartan [Cozaar] 100 mg PO QDAY 02/23/21 02/23/21 Unknown History


 


Metoprolol [Lopressor TAB] 50 mg PO DAILY 02/23/21 02/23/21 Unknown History


 


Montelukast [Singulair] 10 mg PO QPM 02/23/21 02/23/21 Unknown History


 


Potassium Chloride [Klor-Con 8] 20 meq PO QDAY 02/23/21 02/23/21 Unknown History


 


Sitagliptin Phosphate [Januvia] 100 mg PO DAILY 02/23/21 02/23/21 Unknown 

History











Active Meds: 


Active Medications





Acetaminophen (Acetaminophen 325 Mg Tab)  650 mg PO Q4H PRN


   PRN Reason: Pain, Mild (1-3)


Aspirin (Aspirin 325 Mg Tab)  325 mg PO QDAY [facility]


Heparin Sodium (Porcine) (Heparin 5,000 Unit/1 Ml Vial)  5,000 unit SUB-Q Q12HR 

[facility]


   Last Admin: 02/23/21 01:51 Dose:  5,000 unit


   Documented by: 


Morphine Sulfate (Morphine 2 Mg/1 Ml Inj)  2 mg IV Q3H PRN


   PRN Reason: Pain, Moderate (4-6)


Nitroglycerin (Nitroglycerin 2% Oint 1 Gm)  1 inch TP QIDNTG [facility]; Protocol


Nitroglycerin (Nitroglycerin 0.4 Mg Tab Subl)  0.4 mg SL .Q5MIN PRN


   PRN Reason: Chest Pain


Ondansetron HCl (Ondansetron 4 Mg/2 Ml Inj)  4 mg IV Q8H PRN


   PRN Reason: Nausea And Vomiting











Review of Systems


Constitutional: no fever, no chills, no sweats, no night sweats, no weakness, no

 malaise


Eyes: bilateral: other (NO BILATERAL EYE SYMPTOMS)


Ears, nose, mouth and throat: no ear pain


Breasts: deferred


Cardiovascular: chest pain, no orthopnea, no palpitations, no rapid/irregular 

heart beat, no syncope, no lightheadedness, no shortness of breath


Respiratory: no cough, no shortness of breath, no dyspnea on exertion, no 

congestion


Gastrointestinal: no abdominal pain, no nausea, no vomiting, no diarrhea, no 

constipation, no change in bowel habits, no hematemesis


Genitourinary Female: no pelvic pain, no incomplete emptying


Rectal: no pain, no itching


Integumentary: no rash, no pruritis


Neurological: no weakness, no seizures, no syncope, no tremors, no vertigo, no 

headaches


Psychiatric: no anxiety, no depression


Endocrine: nocturia, no polydipsia, no polyuria


Hematologic/Lymphatic: no easy bruising, no easy bleeding





Exam





- Constitutional


Vitals: 


                                        











Temp Pulse Resp BP Pulse Ox


 


 97.6 F   63   16   122/39   93 


 


 02/23/21 03:46  02/23/21 03:46  02/23/21 03:46  02/23/21 03:46  02/23/21 03:46











General appearance: Present: mild distress





- EENT


Eyes: Present: PERRL


ENT: hearing intact





- Neck


Neck: Present: supple, normal ROM





- Respiratory


Respiratory effort: normal





- Cardiovascular


Rhythm: regular


Heart Sounds: Present: S1 & S2.  Absent: gallop, systolic murmur, diastolic 

murmur, click





- Extremities


Extremities: no ischemia, No edema


Peripheral Pulses: within normal limits





- Abdominal


General gastrointestinal: Present: soft, non-tender, non-distended.  Absent: 

tender, distended


Female genitourinary: Present: deferred





- Integumentary


Integumentary: Present: clear, warm, dry





- Musculoskeletal


Musculoskeletal: strength equal bilaterally





- Psychiatric


Psychiatric: appropriate mood/affect





HEART Score





- HEART Score


EKG: Non-specific


Age: > 65


Risk factors: > 3 risk factors or hx of atherosclerotic disease


Troponin: 


                                        











Troponin T  < 0.010 ng/mL (0.00-0.029)   02/23/21  02:15    











Troponin: < normal limit





- Critical Actions


Critical Actions: 4-6 pts:12-16.6% risk of adverse cardiac event. Should be 

admitted (PATIENT BEING EVALUATED FOR CHEST PAIN)





Results





- Labs


CBC & Chem 7: 


                                 02/22/21 21:27 02/22/21 21:27


Labs: 


                             Laboratory Last Values











WBC  6.8 K/mm3 (4.5-11.0)   02/22/21 21:27    


 


RBC  3.64 M/mm3 (3.65-5.03)  L  02/22/21 21:27    


 


Hgb  12.6 gm/dl (10.1-14.3)   02/22/21 21:27    


 


Hct  38.3 % (30.3-42.9)   02/22/21 21:27    


 


MCV  105 fl (79-97)  H  02/22/21 21:27    


 


MCH  35 pg (28-32)  H  02/22/21 21:27    


 


MCHC  33 % (30-34)   02/22/21 21:27    


 


RDW  14.8 % (13.2-15.2)   02/22/21 21:27    


 


Plt Count  227 K/mm3 (140-440)   02/22/21 21:27    


 


Add Manual Diff  Complete   02/22/21 21:27    


 


Total Counted  100   02/22/21 21:27    


 


Seg Neuts % (Manual)  57.0 % (40.0-70.0)   02/22/21 21:27    


 


Lymphocytes % (Manual)  28.0 % (13.4-35.0)   02/22/21 21:27    


 


Monocytes % (Manual)  12.0 % (0.0-7.3)  H  02/22/21  21:27    


 


Eosinophils % (Manual)  2.0 % (0.0-4.3)   02/22/21  21:27    


 


Basophils % (Manual)  1.0 % (0.0-1.8)   02/22/21  21:27    


 


Nucleated RBC %  Not Reportable   02/22/21  21:27    


 


Seg Neutrophils # Man  3.9 K/mm3 (1.8-7.7)   02/22/21  21:27    


 


Band Neutrophils #  0.0 K/mm3  02/22/21  21:27    


 


Lymphocytes # (Manual)  1.9 K/mm3 (1.2-5.4)   02/22/21  21:27    


 


Abs React Lymphs (Man)  0.0 K/mm3  02/22/21 21:27    


 


Monocytes # (Manual)  0.8 K/mm3 (0.0-0.8)   02/22/21  21:27    


 


Eosinophils # (Manual)  0.1 K/mm3 (0.0-0.4)   02/22/21 21:27    


 


Basophils # (Manual)  0.1 K/mm3 (0.0-0.1)   02/22/21  21:27    


 


Metamyelocytes #  0.0 K/mm3  02/22/21 21:27    


 


Myelocytes #  0.0 K/mm3  02/22/21 21:27    


 


Promyelocytes #  0.0 K/mm3  02/22/21  21:27    


 


Blast Cells #  0.0 K/mm3  02/22/21  21:27    


 


WBC Morphology  Not Reportable   02/22/21  21:27    


 


Hypersegmented Neuts  Not Reportable   02/22/21  21:27    


 


Hyposegmented Neuts  Not Reportable   02/22/21  21:27    


 


Hypogranular Neuts  Not Reportable   02/22/21  21:27    


 


Smudge Cells  Not Reportable   02/22/21  21:27    


 


Toxic Granulation  Not Reportable   02/22/21  21:27    


 


Toxic Vacuolation  Not Reportable   02/22/21  21:27    


 


Dohle Bodies  Not Reportable   02/22/21  21:27    


 


Pelger-Huet Anomaly  Not Reportable   02/22/21  21:27    


 


Jarrett Rods  Not Reportable   02/22/21  21:27    


 


Platelet Estimate  Not Reportable   02/22/21  21:27    


 


Clumped Platelets  Not Reportable   02/22/21  21:27    


 


Plt Clumps, EDTA  Not Reportable   02/22/21  21:27    


 


Large Platelets  Not Reportable   02/22/21  21:27    


 


Giant Platelets  Not Reportable   02/22/21  21:27    


 


Platelet Satelliting  Not Reportable   02/22/21  21:27    


 


Plt Morphology Comment  Not Reportable   02/22/21  21:27    


 


RBC Morphology  Normal   02/22/21  21:27    


 


Dimorphic RBCs  Not Reportable   02/22/21  21:27    


 


Polychromasia  Not Reportable   02/22/21  21:27    


 


Hypochromasia  Not Reportable   02/22/21  21:27    


 


Poikilocytosis  Not Reportable   02/22/21  21:27    


 


Anisocytosis  Not Reportable   02/22/21  21:27    


 


Microcytosis  Not Reportable   02/22/21  21:27    


 


Macrocytosis  Not Reportable   02/22/21  21:27    


 


Spherocytes  Not Reportable   02/22/21  21:27    


 


Pappenheimer Bodies  Not Reportable   02/22/21  21:27    


 


Sickle Cells  Not Reportable   02/22/21  21:27    


 


Target Cells  Not Reportable   02/22/21  21:27    


 


Tear Drop Cells  Not Reportable   02/22/21  21:27    


 


Ovalocytes  Not Reportable   02/22/21  21:27    


 


Helmet Cells  Not Reportable   02/22/21  21:27    


 


[place] Bodies  Not Reportable   02/22/21  21:27    


 


Cabot Rings  Not Reportable   02/22/21  21:27    


 


[place] Cells  Not Reportable   02/22/21  21:27    


 


Bite Cells  Not Reportable   02/22/21  21:27    


 


Crenated Cell  Not Reportable   02/22/21  21:27    


 


Elliptocytes  Not Reportable   02/22/21  21:27    


 


Acanthocytes (Spur)  Not Reportable   02/22/21  21:27    


 


Rouleaux  Not Reportable   02/22/21  21:27    


 


Hemoglobin C Crystals  Not Reportable   02/22/21  21:27    


 


Schistocytes  Not Reportable   02/22/21  21:27    


 


Malaria parasites  Not Reportable   02/22/21  21:27    


 


Antolin Bodies  Not Reportable   02/22/21  21:27    


 


Hem Pathologist Commnt  No   02/22/21  21:27    


 


PT  12.4 Sec. (12.2-14.9)   02/22/21  21:27    


 


INR  0.94  (0.87-1.13)   02/22/21  21:27    


 


APTT  27.1 Sec. (24.2-36.6)   02/22/21  21:27    


 


Sodium  138 mmol/L (137-145)   02/22/21  21:27    


 


Potassium  3.5 mmol/L (3.6-5.0)  L  02/22/21  21:27    


 


Chloride  97.4 mmol/L ()  L  02/22/21 21:27    


 


Carbon Dioxide  31 mmol/L (22-30)  H  02/22/21  21:27    


 


Anion Gap  13 mmol/L  02/22/21  21:27    


 


BUN  32 mg/dL (7-17)  H  02/22/21 21:27    


 


Creatinine  1.8 mg/dL (0.6-1.2)  H  02/22/21  21:27    


 


Estimated GFR  32 ml/min  02/22/21  21:27    


 


BUN/Creatinine Ratio  18 %  02/22/21  21:27    


 


Glucose  101 mg/dL ()  H  02/22/21 21:27    


 


Calcium  10.8 mg/dL (8.4-10.2)  H  02/22/21 21:27    


 


Total Bilirubin  0.60 mg/dL (0.1-1.2)   02/22/21 21:27    


 


AST  18 units/L (5-40)   02/22/21 21:27    


 


ALT  13 units/L (7-56)   02/22/21  21:27    


 


Alkaline Phosphatase  53 units/L ()   02/22/21  21:27    


 


Troponin T  < 0.010 ng/mL (0.00-0.029)   02/23/21  02:15    


 


NT-Pro-B Natriuret Pep  385.4 pg/mL (0-900)   02/22/21  21:32    


 


Total Protein  7.4 g/dL (6.3-8.2)   02/22/21 21:27    


 


Albumin  4.3 g/dL (3.9-5)   02/22/21  21:27    


 


Albumin/Globulin Ratio  1.4 %  02/22/21  21:27    











Worthy/IV: 


                                        





Voiding Method                   Toilet











Assessment and Plan





- Patient Problems


(1) Chest pain


Current Visit: Yes   Status: Acute   


Plan to address problem: 


1. SERIAL CARDIAC ENZYMES


 2. NPO 


 3. LEXISCON STRESS TEST


 4. ASPIRIN PO


 5. NITROPASTE


 6. SUBLINGUAL NITROGLYCERIN


 7. TYLENOL PO FOR HEADACHE


 8 I.V MORPHINE FOR PAIN


 9. I.V ZOFRAN FOR NAUSEA AND VOMITING


10. OXYGEN BY NASAL CANNULA


 11 SUBCUT HEPARIN FOR DVT PROPHYLAXIS








(2) CARRIE (acute kidney injury)


Current Visit: No   Status: Acute   


Plan to address problem: 


1. I.V NORMAL SALINE


 2. NEPHROLOGY CONSULT

## 2021-02-23 NOTE — PROGRESS NOTE
Assessment and Plan


Assessment and plan: 


--Atypical chest pain


Current Visit: Yes   Status: Acute   


Plan to address problem: 


1. SERIAL CARDIAC ENZYMES


 2. NPO 


 3. LEXISCON STRESS TEST


 4. ASPIRIN PO


 5. NITROPASTE


 6. SUBLINGUAL NITROGLYCERIN


 7. TYLENOL PO FOR HEADACHE


 8 I.V MORPHINE FOR PAIN


 9. I.V ZOFRAN FOR NAUSEA AND VOMITING


10. OXYGEN BY NASAL CANNULA


 11 SUBCUT HEPARIN FOR DVT PROPHYLAXIS





--CARRIE (acute kidney injury)


Current Visit: No   Status: Acute   


Plan to address problem: 


Vasomotor nephropathy


Closely monitor renal function


Avoid nephrotoxins


Gentle hydration renal evaluation and renal ultrasound if needed.  





--obesity; BMI 35.8. 


Patient needs weight reduction when medically stable


Evaluate for obstructive sleep apnea, outpatient sleep study, 


CPAP BiPAP as needed





--Hypertension





--dyslipidemia.


Continue statin


Low-cholesterol diet





 --DVT prophylaxis





Hospitalist Physical





- Constitutional


Vitals: 


                                        











Temp Pulse Resp BP Pulse Ox


 


 97.7 F   68   20   152/55   94 


 


 02/23/21 09:39  02/23/21 09:39  02/23/21 09:39  02/23/21 09:39  02/23/21 09:39











General appearance: Present: mild distress





HEART Score





- HEART Score


EKG: Non-specific


Age: > 65


Risk factors: > 3 risk factors or hx of atherosclerotic disease


Troponin: 


                                        











Troponin T  < 0.010 ng/mL (0.00-0.029)   02/23/21  07:21    











Troponin: < normal limit





- Critical Actions


Critical Actions: 4-6 pts:12-16.6% risk of adverse cardiac event. Should be 

admitted (PATIENT BEING EVALUATED FOR CHEST PAIN)





Results





- Labs


CBC & Chem 7: 


                                 02/22/21 21:27





                                 02/22/21 21:27


Labs: 


                             Laboratory Last Values











WBC  6.8 K/mm3 (4.5-11.0)   02/22/21  21:27    


 


RBC  3.64 M/mm3 (3.65-5.03)  L  02/22/21  21:27    


 


Hgb  12.6 gm/dl (10.1-14.3)   02/22/21  21:27    


 


Hct  38.3 % (30.3-42.9)   02/22/21  21:27    


 


MCV  105 fl (79-97)  H  02/22/21  21:27    


 


MCH  35 pg (28-32)  H  02/22/21 21:27    


 


MCHC  33 % (30-34)   02/22/21 21:27    


 


RDW  14.8 % (13.2-15.2)   02/22/21 21:27    


 


Plt Count  227 K/mm3 (140-440)   02/22/21 21:27    


 


Add Manual Diff  Complete   02/22/21 21:27    


 


Total Counted  100   02/22/21 21:27    


 


Seg Neuts % (Manual)  57.0 % (40.0-70.0)   02/22/21 21:27    


 


Lymphocytes % (Manual)  28.0 % (13.4-35.0)   02/22/21 21:27    


 


Monocytes % (Manual)  12.0 % (0.0-7.3)  H  02/22/21 21:27    


 


Eosinophils % (Manual)  2.0 % (0.0-4.3)   02/22/21 21:27    


 


Basophils % (Manual)  1.0 % (0.0-1.8)   02/22/21 21:27    


 


Nucleated RBC %  Not Reportable   02/22/21 21:27    


 


Seg Neutrophils # Man  3.9 K/mm3 (1.8-7.7)   02/22/21 21:27    


 


Band Neutrophils #  0.0 K/mm3  02/22/21 21:27    


 


Lymphocytes # (Manual)  1.9 K/mm3 (1.2-5.4)   02/22/21 21:27    


 


Abs React Lymphs (Man)  0.0 K/mm3  02/22/21 21:27    


 


Monocytes # (Manual)  0.8 K/mm3 (0.0-0.8)   02/22/21 21:27    


 


Eosinophils # (Manual)  0.1 K/mm3 (0.0-0.4)   02/22/21 21:27    


 


Basophils # (Manual)  0.1 K/mm3 (0.0-0.1)   02/22/21 21:27    


 


Metamyelocytes #  0.0 K/mm3  02/22/21 21:27    


 


Myelocytes #  0.0 K/mm3  02/22/21 21:27    


 


Promyelocytes #  0.0 K/mm3  02/22/21 21:27    


 


Blast Cells #  0.0 K/mm3  02/22/21 21:27    


 


WBC Morphology  Not Reportable   02/22/21 21:27    


 


Hypersegmented Neuts  Not Reportable   02/22/21  21:27    


 


Hyposegmented Neuts  Not Reportable   02/22/21  21:27    


 


Hypogranular Neuts  Not Reportable   02/22/21  21:27    


 


Smudge Cells  Not Reportable   02/22/21  21:27    


 


Toxic Granulation  Not Reportable   02/22/21  21:27    


 


Toxic Vacuolation  Not Reportable   02/22/21  21:27    


 


Dohle Bodies  Not Reportable   02/22/21  21:27    


 


Pelger-Huet Anomaly  Not Reportable   02/22/21  21:27    


 


Jarrett Rods  Not Reportable   02/22/21  21:27    


 


Platelet Estimate  Not Reportable   02/22/21  21:27    


 


Clumped Platelets  Not Reportable   02/22/21  21:27    


 


Plt Clumps, EDTA  Not Reportable   02/22/21  21:27    


 


Large Platelets  Not Reportable   02/22/21  21:27    


 


Giant Platelets  Not Reportable   02/22/21  21:27    


 


Platelet Satelliting  Not Reportable   02/22/21  21:27    


 


Plt Morphology Comment  Not Reportable   02/22/21  21:27    


 


RBC Morphology  Normal   02/22/21  21:27    


 


Dimorphic RBCs  Not Reportable   02/22/21  21:27    


 


Polychromasia  Not Reportable   02/22/21  21:27    


 


Hypochromasia  Not Reportable   02/22/21  21:27    


 


Poikilocytosis  Not Reportable   02/22/21  21:27    


 


Anisocytosis  Not Reportable   02/22/21  21:27    


 


Microcytosis  Not Reportable   02/22/21  21:27    


 


Macrocytosis  Not Reportable   02/22/21  21:27    


 


Spherocytes  Not Reportable   02/22/21  21:27    


 


Pappenheimer Bodies  Not Reportable   02/22/21  21:27    


 


Sickle Cells  Not Reportable   02/22/21  21:27    


 


Target Cells  Not Reportable   02/22/21  21:27    


 


Tear Drop Cells  Not Reportable   02/22/21  21:27    


 


Ovalocytes  Not Reportable   02/22/21  21:27    


 


Helmet Cells  Not Reportable   02/22/21  21:27    


 


[place] Bodies  Not Reportable   02/22/21  21:27    


 


Cabot Rings  Not Reportable   02/22/21  21:27    


 


[place] Cells  Not Reportable   02/22/21  21:27    


 


Bite Cells  Not Reportable   02/22/21  21:27    


 


Crenated Cell  Not Reportable   02/22/21  21:27    


 


Elliptocytes  Not Reportable   02/22/21  21:27    


 


Acanthocytes (Spur)  Not Reportable   02/22/21  21:27    


 


Rouleaux  Not Reportable   02/22/21  21:27    


 


Hemoglobin C Crystals  Not Reportable   02/22/21  21:27    


 


Schistocytes  Not Reportable   02/22/21  21:27    


 


Malaria parasites  Not Reportable   02/22/21  21:27    


 


Antolin Bodies  Not Reportable   02/22/21  21:27    


 


Hem Pathologist Commnt  No   02/22/21 21:27    


 


PT  12.4 Sec. (12.2-14.9)   02/22/21 21:27    


 


INR  0.94  (0.87-1.13)   02/22/21 21:27    


 


APTT  27.1 Sec. (24.2-36.6)   02/22/21 21:27    


 


Sodium  138 mmol/L (137-145)   02/22/21 21:27    


 


Potassium  3.5 mmol/L (3.6-5.0)  L  02/22/21 21:27    


 


Chloride  97.4 mmol/L ()  L  02/22/21 21:27    


 


Carbon Dioxide  31 mmol/L (22-30)  H  02/22/21 21:27    


 


Anion Gap  13 mmol/L  02/22/21 21:27    


 


BUN  32 mg/dL (7-17)  H  02/22/21  21:27    


 


Creatinine  1.8 mg/dL (0.6-1.2)  H  02/22/21  21:27    


 


Estimated GFR  32 ml/min  02/22/21 21:27    


 


BUN/Creatinine Ratio  18 %  02/22/21 21:27    


 


Glucose  101 mg/dL ()  H  02/22/21 21:27    


 


Calcium  10.8 mg/dL (8.4-10.2)  H  02/22/21 21:27    


 


Total Bilirubin  0.60 mg/dL (0.1-1.2)   02/22/21 21:27    


 


AST  18 units/L (5-40)   02/22/21 21:27    


 


ALT  13 units/L (7-56)   02/22/21  21:27    


 


Alkaline Phosphatase  53 units/L ()   02/22/21  21:27    


 


Total Creatine Kinase  80 units/L ()   02/23/21  07:21    


 


CK-MB (CK-2)  1.7 ng/mL (0.0-4.0)   02/23/21  07:21    


 


CK-MB (CK-2) Rel Index  2.1  (0-4)   02/23/21  07:21    


 


Troponin T  < 0.010 ng/mL (0.00-0.029)   02/23/21  07:21    


 


NT-Pro-B Natriuret Pep  385.4 pg/mL (0-900)   02/22/21  21:32    


 


Total Protein  7.4 g/dL (6.3-8.2)   02/22/21  21:27    


 


Albumin  4.3 g/dL (3.9-5)   02/22/21  21:27    


 


Albumin/Globulin Ratio  1.4 %  02/22/21  21:27    











Worthy/IV: 


                                        





Voiding Method                   Toilet











Active Medications





- Current Medications


Current Medications: 














Generic Name Dose Route Start Last Admin





  Trade Name Freq  PRN Reason Stop Dose Admin


 


Acetaminophen  650 mg  02/23/21 00:26 





  Acetaminophen 325 Mg Tab  PO  





  Q4H PRN  





  Pain, Mild (1-3)  


 


Aspirin  325 mg  02/23/21 10:00  02/23/21 09:33





  Aspirin 325 Mg Tab  PO   325 mg





  QDAY [facility]   Administration


 


Heparin Sodium (Porcine)  5,000 unit  02/23/21 00:30  02/23/21 09:35





  Heparin 5,000 Unit/1 Ml Vial  SUB-Q   5,000 unit





  Q12HR [facility]   Administration


 


Metoprolol Tartrate  50 mg  02/24/21 10:00 





  Metoprolol Tartrate 50 Mg Tab  PO  





  DAILY [facility]  


 


Miscellaneous Medication  50 mg  02/23/21 22:00 





  Hydralazine Hcl [Hydralazine Hcl]  PO  





  BID [facility]  


 


Miscellaneous Medication  40 mg  02/24/21 10:00 





  Simvastatin [Simvastatin]  PO  





  QDAY [facility]  


 


Morphine Sulfate  2 mg  02/23/21 00:25 





  Morphine 2 Mg/1 Ml Inj  IV  





  Q3H PRN  





  Pain, Moderate (4-6)  


 


Nitroglycerin  1 inch  02/23/21 06:00  02/23/21 09:33





  Nitroglycerin 2% Oint 1 Gm  TP   1 inch





  QIDNTG [facility]   Administration





  Protocol  


 


Nitroglycerin  0.4 mg  02/23/21 00:26 





  Nitroglycerin 0.4 Mg Tab Subl  SL  





  .Q5MIN PRN  





  Chest Pain  


 


Ondansetron HCl  4 mg  02/23/21 00:27 





  Ondansetron 4 Mg/2 Ml Inj  IV  





  Q8H PRN  





  Nausea And Vomiting

## 2021-02-23 NOTE — DISCHARGE SUMMARY
Providers





- Providers


Date of Admission: 


02/22/21 23:13





Date of discharge: 02/23/21


Attending physician: 


AMY J KOCHERLA





                                        





02/23/21 07:50


Consult to Physician [CONS] Routine 


   Comment: 


   Consulting Provider: DAVID BALL


   Physician Instructions: 


   Reason For Exam: rd





02/23/21 11:32


Physical Therapy Evaluation and Treat [CONS] Routine 


   Comment: 


   Reason For Exam: debility


   Mode of Transport?: Wheelchair





02/23/21 11:33


Occupational Therapy Evaluate and Treat [CONS] Routine 


   Comment: 


   Reason For Exam: debility











Primary care physician: 


PRIMARY CARE MD








Hospitalization


Condition: Stable


Disposition: DC-01 TO HOME OR SELFCARE


Time spent for discharge: 35 min





Core Measure Documentation





- Palliative Care


Palliative Care/ Comfort Measures: Not Applicable





- Core Measures


Any of the following diagnoses?: none





Exam





- Constitutional


Vitals: 


                                        











Temp Pulse Resp BP Pulse Ox


 


 98.0 F   63   22   136/53   93 


 


 02/23/21 11:08  02/23/21 11:49  02/23/21 11:08  02/23/21 11:49  02/23/21 11:08











General appearance: Present: no acute distress, well-nourished





- EENT


Eyes: Present: PERRL, EOM intact





- Neck


Neck: Present: supple, normal ROM





- Respiratory


Respiratory effort: normal


Respiratory: bilateral: diminished, negative: rales, rhonchi, wheezing





- Cardiovascular


Rhythm: regular


Heart Sounds: Present: S1 & S2





- Extremities


Extremities: no ischemia, No edema





- Abdominal


General gastrointestinal: Present: soft, non-tender, non-distended, normal bowel

 sounds





- Integumentary


Integumentary: Present: clear, warm





- Musculoskeletal


Musculoskeletal: strength equal bilaterally





- Psychiatric


Psychiatric: appropriate mood/affect, cooperative





- Neurologic


Neurologic: moves all extremities





Plan


Activity: advance as tolerated, fall precautions


Diet: other (Cardiac diet)


Additional Instructions: If you if you have worsening symptoms contact MD or go 

to emergency room.  Advised to see private cardiologist Dr. Wayne if you have 

recurrent chest pain or discomfort.  Plenty oral fluids.  Check with private 

nephrologist Dr. Navarrete in 1 week, to monitor your renal function


Follow up with: 


PRIMARY CARE,MD [Primary Care Provider] - 3-5 Days


SUDHAKAR NAVARRETE MD [Staff Physician] - 7 Days


ELVER WAYNE MD [Staff Physician] - 7 Days


Prescriptions: 


Pantoprazole [Protonix TAB] 20 mg PO QDAY #14 tablet.

## 2021-07-09 ENCOUNTER — HOSPITAL ENCOUNTER (OUTPATIENT)
Dept: HOSPITAL 5 - SPVWC | Age: 86
Discharge: HOME | End: 2021-07-09
Attending: SURGERY
Payer: MEDICARE

## 2021-07-09 DIAGNOSIS — N64.89: ICD-10-CM

## 2021-07-09 DIAGNOSIS — Z12.31: Primary | ICD-10-CM

## 2021-07-09 PROCEDURE — 77067 SCR MAMMO BI INCL CAD: CPT

## 2021-07-09 NOTE — MAMMOGRAPHY REPORT
DIGITAL SCREENING MAMMOGRAM WITH CAD, 7/9/2021



CLINICAL INFORMATION / INDICATION: Routine screening mammography.



TECHNIQUE:  Digital bilateral 2D mammography was obtained in the craniocaudal and mediolateral obliqu
e projections. This examination was interpreted with the benefit of Computer-Aided Detection analysis
.



COMPARISON: 7/6/2020



FINDINGS: 



Breast Density: There are scattered areas of fibroglandular density.



No dominant mass, suspicious calcifications, or architectural distortion in either breast. 



Bilateral vascular calcifications are noted. Benign bilateral intramammary lymph nodes are again note
d. Overall, no significant interval change.

 

IMPRESSION: No mammographic evidence of malignancy.



Follow up recommendation: Routine yearly



BI-RADS Category 2:  Benign.





-------------------------------------------------------------------------------------------

A "normal" or negative report should not discourage follow up or biopsy of a clinically significant f
inding.



A written summary of these findings will be mailed to the patient. The patient will be entered into a
 mammography reporting system which will generate a reminder letter for the patient's next appointmen
t at the appropriate interval.



The [employer] College [employer] recommends yearly mammograms starting at age 40 and continuing as l
woodrow as a woman is in good health.  Breast MRI is recommended for women with an approximate 20-25% or 
greater lifetime risk of breast cancer, including women with a strong family history of breast or ova
bessy cancer or who have been treated for Hodgkin's disease.



Signer Name: Rivka Nogueira MD 

Signed: 7/9/2021 12:30 PM

Workstation Name: [id number]

## 2021-10-11 ENCOUNTER — HOSPITAL ENCOUNTER (INPATIENT)
Dept: HOSPITAL 5 - ED | Age: 86
LOS: 4 days | Discharge: HOME | DRG: 177 | End: 2021-10-15
Attending: INTERNAL MEDICINE | Admitting: INTERNAL MEDICINE
Payer: MEDICARE

## 2021-10-11 DIAGNOSIS — N17.0: ICD-10-CM

## 2021-10-11 DIAGNOSIS — Z86.79: ICD-10-CM

## 2021-10-11 DIAGNOSIS — Z90.710: ICD-10-CM

## 2021-10-11 DIAGNOSIS — E11.22: ICD-10-CM

## 2021-10-11 DIAGNOSIS — N18.9: ICD-10-CM

## 2021-10-11 DIAGNOSIS — J96.01: ICD-10-CM

## 2021-10-11 DIAGNOSIS — E78.5: ICD-10-CM

## 2021-10-11 DIAGNOSIS — J45.909: ICD-10-CM

## 2021-10-11 DIAGNOSIS — E78.00: ICD-10-CM

## 2021-10-11 DIAGNOSIS — I12.9: ICD-10-CM

## 2021-10-11 DIAGNOSIS — U07.1: Primary | ICD-10-CM

## 2021-10-11 DIAGNOSIS — M19.90: ICD-10-CM

## 2021-10-11 LAB
ALBUMIN SERPL-MCNC: 4.3 G/DL (ref 3.9–5)
ALT SERPL-CCNC: 18 UNITS/L (ref 7–56)
BASOPHILS # (AUTO): 0.2 K/MM3 (ref 0–0.1)
BASOPHILS NFR BLD AUTO: 1.9 % (ref 0–1.8)
BUN SERPL-MCNC: 29 MG/DL (ref 7–17)
BUN/CREAT SERPL: 22 %
CALCIUM SERPL-MCNC: 10.2 MG/DL (ref 8.4–10.2)
CRP SERPL-MCNC: 8.9 MG/DL (ref 0–1.3)
CRP SERPL-MCNC: 9 MG/DL (ref 0–1.3)
EOSINOPHIL # BLD AUTO: 0 K/MM3 (ref 0–0.4)
EOSINOPHIL NFR BLD AUTO: 0.1 % (ref 0–4.3)
HCT VFR BLD CALC: 41.2 % (ref 30.3–42.9)
HEMOLYSIS INDEX: 6
HGB BLD-MCNC: 13.7 GM/DL (ref 10.1–14.3)
INR PPP: 0.96 (ref 0.87–1.13)
LYMPHOCYTES # BLD AUTO: 1 K/MM3 (ref 1.2–5.4)
LYMPHOCYTES NFR BLD AUTO: 11.7 % (ref 13.4–35)
MCHC RBC AUTO-ENTMCNC: 33 % (ref 30–34)
MCV RBC AUTO: 102 FL (ref 79–97)
MONOCYTES # (AUTO): 0.5 K/MM3 (ref 0–0.8)
MONOCYTES % (AUTO): 5.3 % (ref 0–7.3)
PLATELET # BLD: 174 K/MM3 (ref 140–440)
RBC # BLD AUTO: 4.05 M/MM3 (ref 3.65–5.03)

## 2021-10-11 PROCEDURE — 80048 BASIC METABOLIC PNL TOTAL CA: CPT

## 2021-10-11 PROCEDURE — 82140 ASSAY OF AMMONIA: CPT

## 2021-10-11 PROCEDURE — G0378 HOSPITAL OBSERVATION PER HR: HCPCS

## 2021-10-11 PROCEDURE — 82728 ASSAY OF FERRITIN: CPT

## 2021-10-11 PROCEDURE — 87641 MR-STAPH DNA AMP PROBE: CPT

## 2021-10-11 PROCEDURE — 85025 COMPLETE CBC W/AUTO DIFF WBC: CPT

## 2021-10-11 PROCEDURE — 84145 PROCALCITONIN (PCT): CPT

## 2021-10-11 PROCEDURE — 82962 GLUCOSE BLOOD TEST: CPT

## 2021-10-11 PROCEDURE — 83735 ASSAY OF MAGNESIUM: CPT

## 2021-10-11 PROCEDURE — 93005 ELECTROCARDIOGRAM TRACING: CPT

## 2021-10-11 PROCEDURE — 80053 COMPREHEN METABOLIC PANEL: CPT

## 2021-10-11 PROCEDURE — 87040 BLOOD CULTURE FOR BACTERIA: CPT

## 2021-10-11 PROCEDURE — 82947 ASSAY GLUCOSE BLOOD QUANT: CPT

## 2021-10-11 PROCEDURE — 83615 LACTATE (LD) (LDH) ENZYME: CPT

## 2021-10-11 PROCEDURE — 85379 FIBRIN DEGRADATION QUANT: CPT

## 2021-10-11 PROCEDURE — U0003 INFECTIOUS AGENT DETECTION BY NUCLEIC ACID (DNA OR RNA); SEVERE ACUTE RESPIRATORY SYNDROME CORONAVIRUS 2 (SARS-COV-2) (CORONAVIRUS DISEASE [COVID-19]), AMPLIFIED PROBE TECHNIQUE, MAKING USE OF HIGH THROUGHPUT TECHNOLOGIES AS DESCRIBED BY CMS-2020-01-R: HCPCS

## 2021-10-11 PROCEDURE — 85610 PROTHROMBIN TIME: CPT

## 2021-10-11 PROCEDURE — 82550 ASSAY OF CK (CPK): CPT

## 2021-10-11 PROCEDURE — 71046 X-RAY EXAM CHEST 2 VIEWS: CPT

## 2021-10-11 PROCEDURE — 84484 ASSAY OF TROPONIN QUANT: CPT

## 2021-10-11 PROCEDURE — 83880 ASSAY OF NATRIURETIC PEPTIDE: CPT

## 2021-10-11 PROCEDURE — 36415 COLL VENOUS BLD VENIPUNCTURE: CPT

## 2021-10-11 PROCEDURE — 86140 C-REACTIVE PROTEIN: CPT

## 2021-10-11 NOTE — XRAY REPORT
CHEST 2 VIEWS 



INDICATION / CLINICAL INFORMATION: COUGH



STUDY TIME: 2028



COMPARISON: 2/22/2021



FINDINGS:



SUPPORT DEVICES: None.



HEART / MEDIASTINUM: No significant abnormality. 



LUNGS / PLEURA: Mild scarring is again seen in the left base. There may be a tiny right pleural effus
ion. No obvious pneumonic infiltrates are seen. No pneumothorax. 



ADDITIONAL FINDINGS: No significant additional findings.





Signer Name: Michael Reeves MD 

Signed: 10/11/2021 9:54 PM

Workstation Name: [email or web address]

## 2021-10-11 NOTE — EVENT NOTE
ED Screening Note


ED Screening Note: 





sent by urgent care for concerns for pna


THEY GAVE PT SHOT OF DECADRON 10 M AT 1800 TONIGHT





co fevers to 102.5/cough/ headache





pmh


htn


hld


dm





psh


many othro


hysterectomy


parathyroid


cataract





got covid immunization March/April of this year





mom dec hf


dad dec lymphoma





home rx


losartan


simvastatin


juanuvia


metop


hydral


asa





This initial assessment/diagnostic orders/clinical plan/treatment(s) is/are 

subject to change based on patients health status, clinical progression and re-

assessment by fellow clinical providers in the ED. Further treatment and workup 

at subsequent clinical providers discretion. Patient/guardian urged not to elope

from the ED as their condition may be serious if not clinically assessed and 

managed. 





Initial orders include: 


suellen carty

## 2021-10-11 NOTE — HISTORY AND PHYSICAL REPORT
History of Present Illness


Date of examination: 10/11/21


Date of admission: 


10/11/21 22:01





Chief complaint: 





Fever


Shortness of breath


Cough


History of present illness: 





88-year-old female with known history of chronic kidney disease, hyperlipidemia 

and hypertension presenting to the emergency room today complaining of cough, 

shortness of breath and congestion over the past 3 days.  She denies any 

headache or dizziness and denies any diaphoresis.


She has had a low-grade fever at home.  She denies any nausea vomiting, no 

abdominal pain and no diarrhea.





Patient denies any recent travel but indicates that she has been in contact with

family members who were recently diagnosed with COVID-19.  She has been fully 

vaccinated against COVID-19.





Upon arrival in the emergency room patient was slightly hypoxic with oxygen 

saturation of 88% on minimal exertion.


Work-up today, chest x-ray shows a tiny right pleural effusion, no obvious 

pulmonary infiltrates and no pneumothorax.


Labs were significant for elevated CRP of 8.9, elevated BUN and creatinine of 29

and 1.3.





Patient being admitted for respiratory failure and to possibly rule out for 

breakthrough infection with COVID-19.





Past History


Past Medical History: arthritis, diabetes, hyperlipidemia, other (Kidney stones,

Renal Cyst,Asthma)


Past Surgical History: Other ( Hysterectomy, thyroid surgery, surgery to left 

toe, left foot, left elbow, left hand, intra Ductal Papilloma, Dysplasia under 

tongue, cataract surgery, vein stripping, parathyroidectomy)


Social history: no significant social history


Family history: no significant family history





Medications and Allergies


                                    Allergies











Allergy/AdvReac Type Severity Reaction Status Date / Time


 


Penicillins Allergy  Swelling Verified 10/11/21 19:54


 


Sulfa (Sulfonamide Allergy  Rash Verified 10/11/21 19:54





Antibiotics)     


 


tramadol Allergy  Rash Verified 10/11/21 19:54











                                Home Medications











 Medication  Instructions  Recorded  Confirmed  Last Taken  Type


 


Simvastatin 40 mg PO QDAY 08/26/14 02/23/21 Unknown History


 


Cholecalciferol (Vitamin D3) 2,000 unit PO QDAY 02/23/21 02/23/21 Unknown 

History





[Vitamin D3 2,000 UNIT CAP]     


 


Hydralazine HCl 50 mg PO BID 02/23/21 02/23/21 Unknown History


 


Losartan [Cozaar] 100 mg PO QDAY 02/23/21 02/23/21 Unknown History


 


Metoprolol [Lopressor TAB] 50 mg PO DAILY 02/23/21 02/23/21 Unknown History














Review of Systems


Constitutional: fever, chills


Ears, nose, mouth and throat: no nasal congestion, no sore throat


Cardiovascular: no chest pain, no palpitations


Respiratory: cough, shortness of breath


Gastrointestinal: no abdominal pain, no nausea, no vomiting, no diarrhea


Genitourinary Female: no pelvic pain, no dysuria, no hematuria


Musculoskeletal: no neck pain, no low back pain


Integumentary: no rash, no pruritis


Neurological: no headaches, no confusion


Psychiatric: no anxiety, no depression


Endocrine: no polydipsia, no polyuria, no nocturia





Exam





- Constitutional


Vitals: 


                                        











Temp Pulse Resp BP Pulse Ox


 


 99.3 F   74   20   191/61   91 


 


 10/11/21 19:44  10/11/21 19:44  10/11/21 19:44  10/11/21 19:44  10/11/21 19:44











General appearance: Present: no acute distress, well-nourished





- EENT


Eyes: Present: PERRL, EOM intact.  Absent: scleral icterus


ENT: hearing intact, clear oral mucosa, dentition normal





- Neck


Neck: Present: supple, normal ROM





- Respiratory


Respiratory effort: normal


Respiratory: bilateral: CTA





- Cardiovascular


Rhythm: regular


Heart Sounds: Present: S1 & S2.  Absent: gallop, systolic murmur, diastolic 

murmur, rub, click





- Extremities


Extremities: no ischemia, pulses intact, pulses symmetrical, No edema, normal 

temperature, normal color, Full ROM


Peripheral Pulses: within normal limits





- Abdominal


General gastrointestinal: Present: soft, non-tender, non-distended, normal bowel

sounds.  Absent: mass





- Integumentary


Integumentary: Present: clear, warm, dry.  Absent: rash





- Musculoskeletal


Musculoskeletal: strength equal bilaterally





- Psychiatric


Psychiatric: appropriate mood/affect, intact judgment & insight, memory intact, 

cooperative





- Neurologic


Neurologic: CNII-XII intact, no focal deficits, moves all extremities





HEART Score





- HEART Score


Troponin: 


                                        











Troponin T  < 0.010 ng/mL (0.00-0.029)   10/11/21  20:35    














Results





- Labs


CBC & Chem 7: 


                                 10/11/21 20:35





                                 10/11/21 20:35


Labs: 


                              Abnormal lab results











  10/11/21 10/11/21 10/11/21 Range/Units





  20:35 20:35 20:35 


 


MCV  102 H    (79-97)  fl


 


MCH  34 H    (28-32)  pg


 


Lymph % (Auto)  11.7 L    (13.4-35.0)  %


 


Baso % (Auto)  1.9 H    (0.0-1.8)  %


 


Lymph # (Auto)  1.0 L    (1.2-5.4)  K/mm3


 


Baso # (Auto)  0.2 H    (0.0-0.1)  K/mm3


 


Seg Neutrophils %  81.0 H    (40.0-70.0)  %


 


D-Dimer   774.12 H   (0-234)  ng/mlDDU


 


BUN    29 H  (7-17)  mg/dL


 


Creatinine    1.3 H  (0.6-1.2)  mg/dL


 


Glucose    114 H  ()  mg/dL


 


Lactate Dehydrogenase    207 H  ()  units/L


 


C-Reactive Protein    9.00 H  (0.00-1.30)  mg/dL


 


Total Protein    8.3 H  (6.3-8.2)  g/dL














  10/11/21 Range/Units





  20:35 


 


MCV   (79-97)  fl


 


MCH   (28-32)  pg


 


Lymph % (Auto)   (13.4-35.0)  %


 


Baso % (Auto)   (0.0-1.8)  %


 


Lymph # (Auto)   (1.2-5.4)  K/mm3


 


Baso # (Auto)   (0.0-0.1)  K/mm3


 


Seg Neutrophils %   (40.0-70.0)  %


 


D-Dimer   (0-234)  ng/mlDDU


 


BUN   (7-17)  mg/dL


 


Creatinine   (0.6-1.2)  mg/dL


 


Glucose  117 H  ()  mg/dL


 


Lactate Dehydrogenase  216 H  ()  units/L


 


C-Reactive Protein  8.90 H  (0.00-1.30)  mg/dL


 


Total Protein   (6.3-8.2)  g/dL














Assessment and Plan





- Patient Problems


(1) Acute respiratory failure with hypoxia


Current Visit: Yes   Status: Acute   


Plan to address problem: 


Possibly secondary to underlying bronchitis/pneumonia


We will keep O2 saturation greater or equal to 92%.


Consult placed to pulmonology for evaluation.








(2) Chronic renal insufficiency


Current Visit: Yes   Status: Acute   


Plan to address problem: 


Will monitor BUN/Creatinine.


Patient given some IV fluid.








(3) Suspected 2019-nCoV infection


Current Visit: Yes   Status: Acute   


Plan to address problem: 


Will await COVID-19 testing.


Patient placed on isolation precautions.


Consult placed to infectious disease for evaluation.








(4) HTN (hypertension)


Current Visit: No   Status: Chronic   


Qualifiers: 


   Hypertension type: essential hypertension 


Plan to address problem: 


We will resume routine home medications and monitor vital signs closely.








(5) T2DM (type 2 diabetes mellitus)


Current Visit: No   Status: Chronic   


Qualifiers: 


   Diabetes mellitus long term insulin use: without long term use 


Plan to address problem: 


Patient placed on sliding scale insulin. 


 We will monitor Accu-Cheks.








(6) DVT prophylaxis


Current Visit: No   Status: Acute   


Plan to address problem: 


Patient placed on subcutaneous heparin.








(7) Full code status


Current Visit: Yes   Status: Acute   


Plan to address problem: 


Patient is a full code.

## 2021-10-11 NOTE — EMERGENCY DEPARTMENT REPORT
ED General Adult HPI





- General


Chief complaint: Upper Respiratory Infection


Stated complaint: PER URGENT CARE STATED SUSPECTED PNEUMONIA


PUI?: Yes


Time Seen by Provider: 10/11/21 19:46


Source: patient, RN notes reviewed, old records reviewed


Mode of arrival: Ambulatory


Limitations: No Limitations





- History of Present Illness


Initial comments: 





The patient was evaluated in the emergency department for symptoms described in 

the history of present illness.  He/she was evaluated in the context of the 

global COVID-19 pandemic, which necessitated consideration that the patient 

might be at risk for infection with the virus that causes COVID-19.  

Institutional protocols and algorithms that pertain to the evaluation of 

patients at risk for COVID-19 are in a state of rapid change based on 

information released by regulatory bodies including the CDC and federal and 

state organizations.  These policies and algorithms were followed during the 

patient's care in the emergency department.  Please note that these policies, 

procedures and recommendations changed on a rapid basis.





During the entire history and physical examination, I had on complete personal 

protective equipment.





Primary CARE doctor: Dr. Vinicio Sommers.





The patient is an 88-year-old female.  The patient is referred to the emergency 

room for rule out pneumonia.  The patient is COVID-19 vaccinated.  Past medical 

history includes chronic renal insufficiency, hypertension, and high 

cholesterol.





She presents to the ER with 3 days of cough, congestion, mild shortness of 

breath.  She has chronic loss of smell.  She denies headache, neck pain, chest 

pain, abdominal pain, extremity weakness and numbness.  She states "I feel all 

right."


-: days(s)


Consistency: intermittent


Improves with: none


Worsens with: none





- Related Data


                                Home Medications











 Medication  Instructions  Recorded  Confirmed  Last Taken


 


Simvastatin 40 mg PO QDAY 08/26/14 02/23/21 Unknown


 


Cholecalciferol (Vitamin D3) 2,000 unit PO QDAY 02/23/21 02/23/21 Unknown





[Vitamin D3 2,000 UNIT CAP]    


 


Hydralazine HCl 50 mg PO BID 02/23/21 02/23/21 Unknown


 


Losartan [Cozaar] 100 mg PO QDAY 02/23/21 02/23/21 Unknown


 


Metoprolol [Lopressor TAB] 50 mg PO DAILY 02/23/21 02/23/21 Unknown











                                    Allergies











Allergy/AdvReac Type Severity Reaction Status Date / Time


 


Penicillins Allergy  Swelling Verified 10/11/21 19:54


 


Sulfa (Sulfonamide Allergy  Rash Verified 10/11/21 19:54





Antibiotics)     


 


tramadol Allergy  Rash Verified 10/11/21 19:54














ED Review of Systems


ROS: 


Stated complaint: PER URGENT CARE STATED SUSPECTED PNEUMONIA


Other details as noted in HPI





Constitutional: denies: malaise, weakness


Eyes: denies: eye discharge


ENT: denies: epistaxis


Respiratory: cough, shortness of breath


Cardiovascular: denies: chest pain


Gastrointestinal: denies: abdominal pain


Genitourinary: denies: dysuria


Musculoskeletal: myalgia


Neurological: denies: headache





ED Past Medical Hx





- Past Medical History


Hx Hypertension: Yes


Hx Heart Attack/AMI: No


Hx Diabetes: Yes (per patient pre diabetes)


Hx Arthritis: Yes


Hx Kidney Stones: Yes


Hx Asthma: Yes


Additional medical history: high cholesterol, cyst on kidney





- Surgical History


Additional Surgical History: Hysterectomy, thyroid surgery, surgery to left toe,

left foot, left elbow, left hand, intra Ductal Papilloma, Dysplasia under 

tongue, cataract surgery, vein stripping, parathyroidectomy





- Social History


Smoking Status: Never Smoker


Substance Use Type: None





- Medications


Home Medications: 


                                Home Medications











 Medication  Instructions  Recorded  Confirmed  Last Taken  Type


 


Simvastatin 40 mg PO QDAY 08/26/14 02/23/21 Unknown History


 


Cholecalciferol (Vitamin D3) 2,000 unit PO QDAY 02/23/21 02/23/21 Unknown 

History





[Vitamin D3 2,000 UNIT CAP]     


 


Hydralazine HCl 50 mg PO BID 02/23/21 02/23/21 Unknown History


 


Losartan [Cozaar] 100 mg PO QDAY 02/23/21 02/23/21 Unknown History


 


Metoprolol [Lopressor TAB] 50 mg PO DAILY 02/23/21 02/23/21 Unknown History














ED Physical Exam





- General


Limitations: No Limitations


General appearance: alert, in no apparent distress





- Head


Head exam: Present: atraumatic, normocephalic





- Eye


Eye exam: Present: normal appearance, EOMI.  Absent: nystagmus





- ENT


ENT exam: Present: normal exam, normal orophraynx, mucous membranes moist, 

normal external ear exam





- Neck


Neck exam: Present: normal inspection, full ROM.  Absent: tenderness, 

meningismus





- Respiratory


Respiratory exam: Present: other (Pulmonary auscultation not performed secondary

to lack of disposable stethoscope).  Absent: respiratory distress, stridor





- Cardiovascular


Cardiovascular Exam: Present: other (Cardiac auscultation not performed 

secondary to lack of disposable stethoscope)





- GI/Abdominal


GI/Abdominal exam: Present: soft.  Absent: distended, tenderness, guarding, 

rebound, rigid, pulsatile mass





- Extremities Exam


Extremities exam: Present: normal inspection, full ROM, other (2+ pulses noted 

in the bilateral upper and lower extremities.  There is no palpable cord.   

negative Homans sign.  Muscular compartments are soft.  The pelvis is stable.). 

Absent: pedal edema, calf tenderness





- Back Exam


Back exam: Present: normal inspection, full ROM.  Absent: tenderness, CVA 

tenderness (R), CVA tenderness (L), paraspinal tenderness, vertebral tenderness





- Neurological Exam


Neurological exam: Present: alert, oriented X3, normal gait, other (No facial 

droop.  Tongue midline.  Extraocular movements intact bilaterally.  Facial 

sensation intact to light touch in V1, V2, V3 distribution bilaterally.  5 and a

5 strength in 4 extremities.  Sensation intact to light touch in 4 

extremities.).  Absent: motor sensory deficit





- Psychiatric


Psychiatric exam: Present: normal affect, normal mood





- Skin


Skin exam: Present: warm, dry, intact, normal color.  Absent: rash





ED Course


                                   Vital Signs











  10/11/21





  19:44


 


Temperature 99.3 F


 


Pulse Rate 74


 


Respiratory 20





Rate 


 


Blood Pressure 191/61


 


O2 Sat by Pulse 91





Oximetry 














ED Medical Decision Making





- Lab Data


Result diagrams: 


                                 10/11/21 20:35





                                 10/11/21 20:35








                                   Vital Signs











  10/11/21





  19:44


 


Temperature 99.3 F


 


Pulse Rate 74


 


Respiratory 20





Rate 


 


Blood Pressure 191/61


 


O2 Sat by Pulse 91





Oximetry 











                                   Lab Results











  10/11/21 10/11/21 10/11/21 Range/Units





  20:35 20:35 20:35 


 


WBC  8.8    (4.5-11.0)  K/mm3


 


RBC  4.05    (3.65-5.03)  M/mm3


 


Hgb  13.7    (10.1-14.3)  gm/dl


 


Hct  41.2    (30.3-42.9)  %


 


MCV  102 H    (79-97)  fl


 


MCH  34 H    (28-32)  pg


 


MCHC  33    (30-34)  %


 


RDW  15.1    (13.2-15.2)  %


 


Plt Count  174    (140-440)  K/mm3


 


Lymph % (Auto)  11.7 L    (13.4-35.0)  %


 


Mono % (Auto)  5.3    (0.0-7.3)  %


 


Eos % (Auto)  0.1    (0.0-4.3)  %


 


Baso % (Auto)  1.9 H    (0.0-1.8)  %


 


Lymph # (Auto)  1.0 L    (1.2-5.4)  K/mm3


 


Mono # (Auto)  0.5    (0.0-0.8)  K/mm3


 


Eos # (Auto)  0.0    (0.0-0.4)  K/mm3


 


Baso # (Auto)  0.2 H    (0.0-0.1)  K/mm3


 


Seg Neutrophils %  81.0 H    (40.0-70.0)  %


 


Seg Neutrophils #  7.1    (1.8-7.7)  K/mm3


 


PT   13.4   (12.2-14.9)  Sec.


 


INR   0.96   (0.87-1.13)  


 


D-Dimer   774.12 H   (0-234)  ng/mlDDU


 


Sodium    139  (137-145)  mmol/L


 


Potassium    4.4  (3.6-5.0)  mmol/L


 


Chloride    99.8  ()  mmol/L


 


Carbon Dioxide    23  (22-30)  mmol/L


 


Anion Gap    21  mmol/L


 


BUN    29 H  (7-17)  mg/dL


 


Creatinine    1.3 H  (0.6-1.2)  mg/dL


 


Estimated GFR    47  ml/min


 


BUN/Creatinine Ratio    22  %


 


Glucose    114 H  ()  mg/dL


 


Lactic Acid     (0.7-2.0)  mmol/L


 


Calcium    10.2  (8.4-10.2)  mg/dL


 


Magnesium    2.10  (1.7-2.3)  mg/dL


 


Ferritin     (10.0-200.0)  ng/mL


 


Total Bilirubin    0.70  (0.1-1.2)  mg/dL


 


AST    27  (5-40)  units/L


 


ALT    18  (7-56)  units/L


 


Alkaline Phosphatase    56  ()  units/L


 


Lactate Dehydrogenase    207 H  ()  units/L


 


Total Creatine Kinase    82  ()  units/L


 


Troponin T    < 0.010  (0.00-0.029)  ng/mL


 


C-Reactive Protein    9.00 H  (0.00-1.30)  mg/dL


 


NT-Pro-B Natriuret Pep    367.0  (0-900)  pg/mL


 


Total Protein    8.3 H  (6.3-8.2)  g/dL


 


Albumin    4.3  (3.9-5)  g/dL


 


Albumin/Globulin Ratio    1.1  %














  10/11/21 10/11/21 10/11/21 Range/Units





  20:35 20:35 20:35 


 


WBC     (4.5-11.0)  K/mm3


 


RBC     (3.65-5.03)  M/mm3


 


Hgb     (10.1-14.3)  gm/dl


 


Hct     (30.3-42.9)  %


 


MCV     (79-97)  fl


 


MCH     (28-32)  pg


 


MCHC     (30-34)  %


 


RDW     (13.2-15.2)  %


 


Plt Count     (140-440)  K/mm3


 


Lymph % (Auto)     (13.4-35.0)  %


 


Mono % (Auto)     (0.0-7.3)  %


 


Eos % (Auto)     (0.0-4.3)  %


 


Baso % (Auto)     (0.0-1.8)  %


 


Lymph # (Auto)     (1.2-5.4)  K/mm3


 


Mono # (Auto)     (0.0-0.8)  K/mm3


 


Eos # (Auto)     (0.0-0.4)  K/mm3


 


Baso # (Auto)     (0.0-0.1)  K/mm3


 


Seg Neutrophils %     (40.0-70.0)  %


 


Seg Neutrophils #     (1.8-7.7)  K/mm3


 


PT     (12.2-14.9)  Sec.


 


INR     (0.87-1.13)  


 


D-Dimer     (0-234)  ng/mlDDU


 


Sodium     (137-145)  mmol/L


 


Potassium     (3.6-5.0)  mmol/L


 


Chloride     ()  mmol/L


 


Carbon Dioxide     (22-30)  mmol/L


 


Anion Gap     mmol/L


 


BUN     (7-17)  mg/dL


 


Creatinine     (0.6-1.2)  mg/dL


 


Estimated GFR     ml/min


 


BUN/Creatinine Ratio     %


 


Glucose    117 H  ()  mg/dL


 


Lactic Acid  0.90    (0.7-2.0)  mmol/L


 


Calcium     (8.4-10.2)  mg/dL


 


Magnesium     (1.7-2.3)  mg/dL


 


Ferritin   104.1   (10.0-200.0)  ng/mL


 


Total Bilirubin     (0.1-1.2)  mg/dL


 


AST     (5-40)  units/L


 


ALT     (7-56)  units/L


 


Alkaline Phosphatase     ()  units/L


 


Lactate Dehydrogenase    216 H  ()  units/L


 


Total Creatine Kinase     ()  units/L


 


Troponin T     (0.00-0.029)  ng/mL


 


C-Reactive Protein    8.90 H  (0.00-1.30)  mg/dL


 


NT-Pro-B Natriuret Pep     (0-900)  pg/mL


 


Total Protein     (6.3-8.2)  g/dL


 


Albumin     (3.9-5)  g/dL


 


Albumin/Globulin Ratio     %














- EKG Data


-: EKG Interpreted by Me


EKG shows normal: sinus rhythm


Rate: normal





- EKG Data





10/11/21 23:23


EKG is interpreted at 21: 54





Sinus rhythm, 69 bpm.  There is a borderline normal axis deviation, there is a 

normal P wave axis.  There is a OR interval of 187 ms.  There is poor R wave 

progression.  There is low voltage.  This is an abnormal EKG.  This is not a 

STEMI.





The EKG today appears to be fairly unchanged when compared to prior EKG from 

February 2021.





- Radiology Data


Radiology results: pending, report reviewed, image reviewed





CHEST 2 VIEWS  INDICATION / CLINICAL INFORMATION: COUGH  STUDY TIME: 2028  

COMPARISON: 2/22/2021  FINDINGS:  SUPPORT DEVICES: None.  HEART / MEDIASTINUM: 

No significant abnormality.  LUNGS / PLEURA: Mild scarring is again seen in the 

left base. There may be a tiny right pleural effusion. No obvious pneumonic inf

iltrates are seen. No pneumothorax.  ADDITIONAL FINDINGS: No significant 

additional findings.   Signer Name: Michael Reeves MD Signed: 10/11/2021 8:54 

PM Workstation Name: [id number]





- Medical Decision Making





Differential diagnosis, including but not limited to: Pneumonia, bronchitis, 

COVID-19, chronic renal insufficiency, respiratory failure





Assessment and plan:





88-year-old female, who is COVID-19 vaccinated, presenting with acute hypoxic 

respiratory failure, in the context of pneumonia/viral illness-like symptoms.  

She is extraordinarily well-appearing.  However, with her advanced age, and 

multiple medical comorbidities, the patient has a high probability of 

decompensation.





I provided this patient with a trial of ambulation personally on a portable 

pulse oximeter, and she desaturated to 88%.  Given acute hypoxic respiratory 

failure with ambulation, clinical history, advanced age, medical comorbidities, 

admission is recommended to the medical service for supportive care.  

Antibiotics ordered, supplemental oxygen ordered.  Covid laboratory studies orde

red.  Patient denies travel, surgery, immobilization, traditional DVT, PE risk 

factors.





The patient tells me that Decadron was administered to her in the urgent care 

center that she presented at prior to her ER evaluation.  The patient is 

agreeable to admission and hospitalization.





Hospital physician, Dr. HOOD Richardson to admit to [facility]


Critical care attestation.: 


If time is entered above; I have spent that time in minutes in the direct care 

of this critically ill patient, excluding procedure time.








ED Disposition


Clinical Impression: 


 Acute respiratory failure with hypoxia, Chronic renal insufficiency, Suspected 

2019-nCoV infection, COVID-19 vaccine administered





Disposition: 09 ADMITTED AS INPATIENT


Is pt being admited?: Yes


Does the pt Need Aspirin: No


Condition: Good

## 2021-10-12 LAB
BUN SERPL-MCNC: 28 MG/DL (ref 7–17)
BUN/CREAT SERPL: 25 %
CALCIUM SERPL-MCNC: 9.3 MG/DL (ref 8.4–10.2)
HEMOLYSIS INDEX: 6
INR PPP: 0.98 (ref 0.87–1.13)

## 2021-10-12 PROCEDURE — 8E0ZXY6 ISOLATION: ICD-10-PCS | Performed by: INTERNAL MEDICINE

## 2021-10-12 RX ADMIN — INSULIN LISPRO SCH: 100 INJECTION, SOLUTION INTRAVENOUS; SUBCUTANEOUS at 22:36

## 2021-10-12 RX ADMIN — HEPARIN SODIUM SCH UNIT: 5000 INJECTION, SOLUTION INTRAVENOUS; SUBCUTANEOUS at 22:37

## 2021-10-12 RX ADMIN — Medication SCH ML: at 12:32

## 2021-10-12 RX ADMIN — INSULIN LISPRO SCH: 100 INJECTION, SOLUTION INTRAVENOUS; SUBCUTANEOUS at 16:30

## 2021-10-12 RX ADMIN — HEPARIN SODIUM SCH UNIT: 5000 INJECTION, SOLUTION INTRAVENOUS; SUBCUTANEOUS at 10:42

## 2021-10-12 RX ADMIN — HEPARIN SODIUM SCH UNIT: 5000 INJECTION, SOLUTION INTRAVENOUS; SUBCUTANEOUS at 14:38

## 2021-10-12 RX ADMIN — INSULIN LISPRO SCH: 100 INJECTION, SOLUTION INTRAVENOUS; SUBCUTANEOUS at 14:40

## 2021-10-12 RX ADMIN — Medication SCH ML: at 22:37

## 2021-10-12 RX ADMIN — INSULIN LISPRO SCH: 100 INJECTION, SOLUTION INTRAVENOUS; SUBCUTANEOUS at 10:37

## 2021-10-12 RX ADMIN — METOPROLOL TARTRATE SCH MG: 50 TABLET, FILM COATED ORAL at 16:24

## 2021-10-12 NOTE — CONSULTATION
History of Present Illness





- Reason for Consult


Consult date: 10/12/21





- History of Present Illness





88-year-old female past medical history CKD, HLD, HTN presented to hospital 

complaining of cough, shortness of breath.  This began approximately 3 days 

prior to admission and was worsening since onset.  She otherwise denies any 

symptoms.  She notes having family members were recently diagnosed with COVID-

19.  She reports being fully vaccinated against Covid as well.  Found to hypoxic

on admission.





Afebrile, normal white count.  Covid PCR pending.  Procalcitonin slightly 

elevated in the setting of CKD.  Blood cultures no growth so far.  Currently on 

Levaquin.





Imaging personally reviewed:


Chest x-ray: No significant findings.





Review of systems:


Deferred to reduce to the risk of transmission of COVID-19





Past History


Past Medical History: arthritis, diabetes, hyperlipidemia, other (Kidney stones,

Renal Cyst,Asthma)


Past Surgical History: Other ( Hysterectomy, thyroid surgery, surgery to left 

toe, left foot, left elbow, left hand, intra Ductal Papilloma, Dysplasia under 

tongue, cataract surgery, vein stripping, parathyroidectomy)


Social history: no significant social history


Family history: no significant family history





Medications and Allergies


                                    Allergies











Allergy/AdvReac Type Severity Reaction Status Date / Time


 


Penicillins Allergy  Swelling Verified 10/11/21 19:54


 


Sulfa (Sulfonamide Allergy  Rash Verified 10/11/21 19:54





Antibiotics)     


 


tramadol Allergy  Rash Verified 10/11/21 19:54











                                Home Medications











 Medication  Instructions  Recorded  Confirmed  Last Taken  Type


 


Simvastatin 40 mg PO QDAY 08/26/14 02/23/21 Unknown History


 


Cholecalciferol (Vitamin D3) 2,000 unit PO QDAY 02/23/21 02/23/21 Unknown 

History





[Vitamin D3 2,000 UNIT CAP]     


 


Hydralazine HCl 50 mg PO BID 02/23/21 02/23/21 Unknown History


 


Losartan [Cozaar] 100 mg PO QDAY 02/23/21 02/23/21 Unknown History


 


Metoprolol [Lopressor TAB] 50 mg PO DAILY 02/23/21 02/23/21 Unknown History











Active Meds: 


Active Medications





Acetaminophen (Acetaminophen 325 Mg Tab)  650 mg PO Q4H PRN


   PRN Reason: Pain MILD(1-3)/Fever >100.5/HA


Dextrose (Dextrose 50% In Water (25gm) 50 Ml Syringe)  50 ml IV Q30MIN PRN; 

Protocol


   PRN Reason: Hypoglycemia


Heparin Sodium (Porcine) (Heparin 5,000 Unit/1 Ml Vial)  5,000 unit SUB-Q Q8HR 

LACI


   Last Admin: 10/12/21 10:42 Dose:  5,000 unit


   Documented by: 


Sodium Chloride (Nacl 0.9% 1000 Ml)  1,000 mls @ 75 mls/hr IV AS DIRECT LACI


Levofloxacin/Dextrose (Levaquin 750mg/150ml)  750 mg in 150 mls @ 100 mls/hr IV 

Q48H LACI; Protocol


Insulin Human Lispro (Insulin Lispro 100 Unit/Ml)  0 unit SUB-Q ACHS LACI; 

Protocol


   Last Admin: 10/12/21 10:37 Dose:  Not Given


   Documented by: 


Magnesium Hydroxide (Magnesium Hydroxide (Mom) Oral Liqd Udc)  30 ml PO Q4H PRN


   PRN Reason: Constipation


Morphine Sulfate (Morphine 2 Mg/1 Ml Inj)  2 mg IV Q4H PRN


   PRN Reason: Pain, Moderate (4-6)


Ondansetron HCl (Ondansetron 4 Mg/2 Ml Inj)  4 mg IV Q8H PRN


   PRN Reason: Nausea And Vomiting


Sodium Chloride (Sodium Chloride 0.9% 10 Ml Flush Syringe)  10 ml IV BID LACI


Sodium Chloride (Sodium Chloride 0.9% 10 Ml Flush Syringe)  10 ml IV PRN PRN


   PRN Reason: LINE FLUSH











Physical Examination





- Physical Exam


Narrative exam: 





Physical exam deferred to reduce risk of transmission of COVID-19.  Please refer

to primary team's note.





- Constitutional


Vitals: 


                                   Vital Signs











Temp Pulse Resp BP Pulse Ox


 


 99.3 F   74   20   191/61   91 


 


 10/11/21 19:44  10/11/21 19:44  10/11/21 19:44  10/11/21 19:44  10/11/21 19:44








                           Temperature -Last 24 Hours











Temperature                    99.3 F

















Results





- Labs


CBC & Chem 7: 


                                 10/11/21 20:35





                                 10/12/21 05:57


Labs: 


                              Abnormal lab results











  10/11/21 10/11/21 10/11/21 Range/Units





  20:35 20:35 20:35 


 


MCV  102 H    (79-97)  fl


 


MCH  34 H    (28-32)  pg


 


Lymph % (Auto)  11.7 L    (13.4-35.0)  %


 


Baso % (Auto)  1.9 H    (0.0-1.8)  %


 


Lymph # (Auto)  1.0 L    (1.2-5.4)  K/mm3


 


Baso # (Auto)  0.2 H    (0.0-0.1)  K/mm3


 


Seg Neutrophils %  81.0 H    (40.0-70.0)  %


 


D-Dimer   774.12 H   (0-234)  ng/mlDDU


 


BUN    29 H  (7-17)  mg/dL


 


Creatinine    1.3 H  (0.6-1.2)  mg/dL


 


Glucose    114 H  ()  mg/dL


 


POC Glucose     ()  mg/dL


 


Lactate Dehydrogenase    207 H  ()  units/L


 


C-Reactive Protein    9.00 H  (0.00-1.30)  mg/dL


 


Total Protein    8.3 H  (6.3-8.2)  g/dL














  10/11/21 10/12/21 10/12/21 Range/Units





  20:35 05:57 10:33 


 


MCV     (79-97)  fl


 


MCH     (28-32)  pg


 


Lymph % (Auto)     (13.4-35.0)  %


 


Baso % (Auto)     (0.0-1.8)  %


 


Lymph # (Auto)     (1.2-5.4)  K/mm3


 


Baso # (Auto)     (0.0-0.1)  K/mm3


 


Seg Neutrophils %     (40.0-70.0)  %


 


D-Dimer     (0-234)  ng/mlDDU


 


BUN   28 H   (7-17)  mg/dL


 


Creatinine     (0.6-1.2)  mg/dL


 


Glucose  117 H  136 H   ()  mg/dL


 


POC Glucose    114 H  ()  mg/dL


 


Lactate Dehydrogenase  216 H    ()  units/L


 


C-Reactive Protein  8.90 H    (0.00-1.30)  mg/dL


 


Total Protein     (6.3-8.2)  g/dL














Assessment and Plan





Cultures:


Blood culture no growth so far





A/P: 88-year-old female past medical history CKD, HTN, HLD admitted as Covid 

PUI.





#Covid PUI: With shortness of breath, hypoxia.  Chest x-ray normal so far, 

awaiting Covid PCR.  She is fully vaccinated against Covid.





#CKD: Renally dose antibiotics.





#Acute hypoxic respiratory failure: O2 sats 88% on admission.





Recs:


-Follow-up Covid PCR.  If positive start dexamethasone 6 mg every 24 hours


-Continue Levaquin for now given elevated procalcitonin.  Complete 5 days.


-Anticoagulation per hospital protocol





Thank you for the consult, we will continue to follow.





DONAVAN Brown MD


[facility] ([facility])


O: [phone]


F: [phone]

## 2021-10-12 NOTE — ELECTROCARDIOGRAPH REPORT
[facility]

                                       

Test Date:    2021-10-11               Test Time:    21:54:50

Pat Name:     CHARLY WHITE            Department:   

Patient ID:   [id number]          Room:         A354

Gender:       F                        Technician:   ZULMA

:          1933               Requested By: DEWEY TRAN

Order Number: [id number]              Reading MD:   Zay Benz

                                 Measurements

Intervals                              Axis          

Rate:         69                       P:            -3

IN:           187                      QRS:          28

QRSD:         86                       T:            48

QT:           391                                    

QTc:          420                                    

                           Interpretive Statements

Sinus rhythm

Low voltage, precordial leads

No previous ECG available for comparison

Electronically Signed On 10- 10:43:17 EDT by Zay Benz

## 2021-10-12 NOTE — PROGRESS NOTE
Assessment and Plan





-- Acute respiratory failure with hypoxia 


Possibly secondary to underlying bronchitis/pneumonia


We will keep O2 saturation greater or equal to 92%.


Consult placed to pulmonology for evaluation.








--CARRIE, likely vasomotor nephropathy


Will monitor BUN/Creatinine.


Patient given some IV fluid.








--Suspected 2019-nCoV infection


Will await COVID-19 testing.


Patient placed on isolation precautions.


Consult placed to infectious disease for evaluation.








-- HTN (hypertension), 


resumed routine home medications and monitor vital signs closely.


Adjust medications as needed to keep SBP less than 140








-- T2DM (type 2 diabetes mellitus)


Patient placed on sliding scale insulin. 


 We will monitor Accu-Cheks.








--DVT prophylaxis


Patient placed on subcutaneous heparin.








-- Full code status





Daily clinical course:


10/12/21: Pending Covid test, initiated on Covid protocol, adjust BP meds to 

keep SBP <140











Subjective


Date of service: 10/12/21


Interval history: 





Patient seen and examined.  Medical records and medication list reviewed.  


No acute event overnight noted by the RN.  


Patient currently on room air.  Patient is tolerating diet.  


Discussed plan of care at bedside with patient.








Objective





- Exam


Narrative Exam: 





Limited physical exam due to COVID-19 pandemic to minimize transmission of the 

disease and to preserve PPE.  Vital reviewed and stable.





GENERAL:  well-developed well-nourished elderly female lying on bed appeared to 

be in no discomfort. 


HEENT: Normocephalic.  Atraumatic. 


NECK: Supple.  


CHEST/LUNGS: breathing nonlabored. 


HEART/CARDIOVASCULAR: Heart rate stable on telemetry


ABDOMEN: Visibly not distended


SKIN: There is no rash 


NEURO:  No focal motor deficit.  Follows command.


MUSCULOSKELETAL: No joint effusion 


EXTRIMITY: No swelling, no cyanosis or clubbing.


PSYCH:  Cooperative.











- Labs


CBC & Chem 7: 


                                 10/14/21 07:34





                                 10/14/21 07:34


Labs: 


                              Abnormal lab results











  10/11/21 10/11/21 10/11/21 Range/Units





  20:35 20:35 20:35 


 


MCV  102 H    (79-97)  fl


 


MCH  34 H    (28-32)  pg


 


Lymph % (Auto)  11.7 L    (13.4-35.0)  %


 


Baso % (Auto)  1.9 H    (0.0-1.8)  %


 


Lymph # (Auto)  1.0 L    (1.2-5.4)  K/mm3


 


Baso # (Auto)  0.2 H    (0.0-0.1)  K/mm3


 


Seg Neutrophils %  81.0 H    (40.0-70.0)  %


 


D-Dimer   774.12 H   (0-234)  ng/mlDDU


 


BUN    29 H  (7-17)  mg/dL


 


Creatinine    1.3 H  (0.6-1.2)  mg/dL


 


Glucose    114 H  ()  mg/dL


 


POC Glucose     ()  mg/dL


 


Lactate Dehydrogenase    207 H  ()  units/L


 


C-Reactive Protein    9.00 H  (0.00-1.30)  mg/dL


 


Total Protein    8.3 H  (6.3-8.2)  g/dL














  10/11/21 10/12/21 10/12/21 Range/Units





  20:35 05:57 10:33 


 


MCV     (79-97)  fl


 


MCH     (28-32)  pg


 


Lymph % (Auto)     (13.4-35.0)  %


 


Baso % (Auto)     (0.0-1.8)  %


 


Lymph # (Auto)     (1.2-5.4)  K/mm3


 


Baso # (Auto)     (0.0-0.1)  K/mm3


 


Seg Neutrophils %     (40.0-70.0)  %


 


D-Dimer     (0-234)  ng/mlDDU


 


BUN   28 H   (7-17)  mg/dL


 


Creatinine     (0.6-1.2)  mg/dL


 


Glucose  117 H  136 H   ()  mg/dL


 


POC Glucose    114 H  ()  mg/dL


 


Lactate Dehydrogenase  216 H    ()  units/L


 


C-Reactive Protein  8.90 H    (0.00-1.30)  mg/dL


 


Total Protein     (6.3-8.2)  g/dL














HEART Score





- HEART Score


Troponin: 


                                        











Troponin T  < 0.010 ng/mL (0.00-0.029)   10/11/21  20:35

## 2021-10-12 NOTE — CONSULTATION
History of Present Illness


Consult date: 10/12/21


Reason for consult: hypoxemia, other (COVID PUI)


History of present illness: 





88-year-old female with known history of chronic kidney disease, hyperlipidemia 

and hypertension presented to the emergency room complaining of cough, shortness

of breath and congestion for 3 days.  She denies any headache or dizziness and 

denies any diaphoresis.


She has had a low-grade fever at home.  She denies any nausea vomiting, no 

abdominal pain and no diarrhea.





Patient denies any recent travel but indicates that she has been in contact with

family members who were recently diagnosed with COVID-19.  She has been fully 

vaccinated against COVID-19.





In the emergency room patient was hypoxic with oxygen saturation of 88% on 

minimal exertion.


chest x-ray shows a tiny right pleural effusion, no obvious pulmonary 

infiltrates and no pneumothorax.


Labs were significant for elevated CRP of 8.9, elevated BUN and creatinine of 29

and 1.3.





Patient being admitted for respiratory failure and to possibly rule out for 

breakthrough infection with COVID-19.





Past History


Past Medical History: arthritis, diabetes, hyperlipidemia, other (Kidney stones,

Renal Cyst,Asthma)


Past Surgical History: Other ( Hysterectomy, thyroid surgery, surgery to left 

toe, left foot, left elbow, left hand, intra Ductal Papilloma, Dysplasia under 

tongue, cataract surgery, vein stripping, parathyroidectomy)


Social history: no significant social history


Family history: no significant family history





Medications and Allergies


                                    Allergies











Allergy/AdvReac Type Severity Reaction Status Date / Time


 


Penicillins Allergy  Swelling Verified 10/11/21 19:54


 


Sulfa (Sulfonamide Allergy  Rash Verified 10/11/21 19:54





Antibiotics)     


 


tramadol Allergy  Rash Verified 10/11/21 19:54











                                Home Medications











 Medication  Instructions  Recorded  Confirmed  Last Taken  Type


 


Simvastatin 40 mg PO QDAY 08/26/14 10/12/21 10/12/21 09:00 History


 


Cholecalciferol (Vitamin D3) 2,000 unit PO QDAY 02/23/21 10/12/21 10/12/21 09:00

History





[Vitamin D3 2,000 UNIT CAP]     


 


Hydralazine HCl 50 mg PO BID 02/23/21 10/12/21 10/12/21 09:00 History


 


Losartan [Cozaar] 100 mg PO QDAY 02/23/21 10/12/21 10/12/21 09:00 History


 


Metoprolol [Lopressor TAB] 50 mg PO DAILY 02/23/21 10/12/21 10/12/21 09:00 

History











Active Meds: 


Active Medications





Acetaminophen (Acetaminophen 325 Mg Tab)  650 mg PO Q4H PRN


   PRN Reason: Pain MILD(1-3)/Fever >100.5/HA


Dextrose (Dextrose 50% In Water (25gm) 50 Ml Syringe)  50 ml IV Q30MIN PRN; 

Protocol


   PRN Reason: Hypoglycemia


Heparin Sodium (Porcine) (Heparin 5,000 Unit/1 Ml Vial)  5,000 unit SUB-Q Q8HR 

LACI


   Last Admin: 10/12/21 10:42 Dose:  5,000 unit


   Documented by: 


Sodium Chloride (Nacl 0.9% 1000 Ml)  1,000 mls @ 75 mls/hr IV AS DIRECT LACI


Levofloxacin/Dextrose (Levaquin 750mg/150ml)  750 mg in 150 mls @ 100 mls/hr IV 

Q48H LACI; Protocol


Insulin Human Lispro (Insulin Lispro 100 Unit/Ml)  0 unit SUB-Q ACHS LACI; 

Protocol


   Last Admin: 10/12/21 10:37 Dose:  Not Given


   Documented by: 


Magnesium Hydroxide (Magnesium Hydroxide (Mom) Oral Liqd Udc)  30 ml PO Q4H PRN


   PRN Reason: Constipation


Morphine Sulfate (Morphine 2 Mg/1 Ml Inj)  2 mg IV Q4H PRN


   PRN Reason: Pain, Moderate (4-6)


Ondansetron HCl (Ondansetron 4 Mg/2 Ml Inj)  4 mg IV Q8H PRN


   PRN Reason: Nausea And Vomiting


Sodium Chloride (Sodium Chloride 0.9% 10 Ml Flush Syringe)  10 ml IV BID LACI


Sodium Chloride (Sodium Chloride 0.9% 10 Ml Flush Syringe)  10 ml IV PRN PRN


   PRN Reason: LINE FLUSH











Physical Examination


Vital signs: 


                                   Vital Signs











Temp Pulse Resp BP Pulse Ox


 


 99.3 F   74   20   191/61   91 


 


 10/11/21 19:44  10/11/21 19:44  10/11/21 19:44  10/11/21 19:44  10/11/21 19:44














Results





- Laboratory Findings


CBC and BMP: 


                                 10/14/21 07:34





                                 10/13/21 12:48


PT/INR, D-dimer











PT  13.6 Sec. (12.2-14.9)   10/12/21  05:57    


 


INR  0.98  (0.87-1.13)   10/12/21  05:57    


 


D-Dimer  774.12 ng/mlDDU (0-234)  H  10/11/21  20:35    








Abnormal lab findings: 


                                  Abnormal Labs











  10/11/21 10/11/21 10/11/21





  20:35 20:35 20:35


 


MCV  102 H  


 


MCH  34 H  


 


Lymph % (Auto)  11.7 L  


 


Baso % (Auto)  1.9 H  


 


Lymph # (Auto)  1.0 L  


 


Baso # (Auto)  0.2 H  


 


Seg Neutrophils %  81.0 H  


 


D-Dimer   774.12 H 


 


BUN    29 H


 


Creatinine    1.3 H


 


Glucose    114 H


 


POC Glucose   


 


Lactate Dehydrogenase    207 H


 


C-Reactive Protein    9.00 H


 


Total Protein    8.3 H














  10/11/21 10/12/21 10/12/21





  20:35 05:57 10:33


 


MCV   


 


MCH   


 


Lymph % (Auto)   


 


Baso % (Auto)   


 


Lymph # (Auto)   


 


Baso # (Auto)   


 


Seg Neutrophils %   


 


D-Dimer   


 


BUN   28 H 


 


Creatinine   


 


Glucose  117 H  136 H 


 


POC Glucose    114 H


 


Lactate Dehydrogenase  216 H  


 


C-Reactive Protein  8.90 H  


 


Total Protein   














- Diagnostic Findings


Chest x-ray: image reviewed





Assessment and Plan





89 y/o female with resolved respiratory failure, rule out COVID given positive 

contacts, fully vaccinated





Empiric steroids


Await COVID PCR


Supplemental O2

## 2021-10-13 LAB
ALBUMIN SERPL-MCNC: 3.4 G/DL (ref 3.9–5)
ALT SERPL-CCNC: 15 UNITS/L (ref 7–56)
BUN SERPL-MCNC: 30 MG/DL (ref 7–17)
BUN/CREAT SERPL: 25 %
CALCIUM SERPL-MCNC: 9.4 MG/DL (ref 8.4–10.2)
HEMOLYSIS INDEX: 6

## 2021-10-13 PROCEDURE — XW033E5 INTRODUCTION OF REMDESIVIR ANTI-INFECTIVE INTO PERIPHERAL VEIN, PERCUTANEOUS APPROACH, NEW TECHNOLOGY GROUP 5: ICD-10-PCS | Performed by: INTERNAL MEDICINE

## 2021-10-13 RX ADMIN — LOSARTAN POTASSIUM SCH MG: 50 TABLET, FILM COATED ORAL at 09:32

## 2021-10-13 RX ADMIN — HEPARIN SODIUM SCH UNIT: 5000 INJECTION, SOLUTION INTRAVENOUS; SUBCUTANEOUS at 13:05

## 2021-10-13 RX ADMIN — HEPARIN SODIUM SCH UNIT: 5000 INJECTION, SOLUTION INTRAVENOUS; SUBCUTANEOUS at 07:07

## 2021-10-13 RX ADMIN — SODIUM CHLORIDE SCH: 9 INJECTION, SOLUTION INTRAVENOUS at 11:35

## 2021-10-13 RX ADMIN — INSULIN LISPRO SCH: 100 INJECTION, SOLUTION INTRAVENOUS; SUBCUTANEOUS at 09:28

## 2021-10-13 RX ADMIN — PRAVASTATIN SODIUM SCH MG: 80 TABLET ORAL at 09:33

## 2021-10-13 RX ADMIN — HEPARIN SODIUM SCH UNIT: 5000 INJECTION, SOLUTION INTRAVENOUS; SUBCUTANEOUS at 22:25

## 2021-10-13 RX ADMIN — Medication SCH UNIT: at 09:33

## 2021-10-13 RX ADMIN — INSULIN LISPRO SCH: 100 INJECTION, SOLUTION INTRAVENOUS; SUBCUTANEOUS at 22:24

## 2021-10-13 RX ADMIN — Medication SCH ML: at 22:24

## 2021-10-13 RX ADMIN — Medication SCH ML: at 09:33

## 2021-10-13 RX ADMIN — INSULIN LISPRO SCH: 100 INJECTION, SOLUTION INTRAVENOUS; SUBCUTANEOUS at 17:52

## 2021-10-13 RX ADMIN — METOPROLOL TARTRATE SCH MG: 50 TABLET, FILM COATED ORAL at 09:32

## 2021-10-13 RX ADMIN — INSULIN LISPRO SCH: 100 INJECTION, SOLUTION INTRAVENOUS; SUBCUTANEOUS at 12:29

## 2021-10-13 NOTE — PROGRESS NOTE
Assessment and Plan





87 y/o female with resolved respiratory failure, found to be covid positive





1.  Steroids


2.  ID has ordered remdesivir


3.  Even though she is not hypoxic, still would suggest proning


4.  Guarded prognosis.





Subjective


Date of service: 10/13/21


Interval history: 





COVID positive but weaned to room air.





Objective


                               Vital Signs - 12hr











  10/13/21 10/13/21 10/13/21





  05:13 09:19 09:22


 


Temperature 98.0 F  


 


Pulse Rate 61  


 


Respiratory 20  





Rate   


 


Blood Pressure 166/56  


 


O2 Sat by Pulse 94 95 21 L





Oximetry   














  10/13/21 10/13/21 10/13/21





  09:32 09:34 11:41


 


Temperature   


 


Pulse Rate   


 


Respiratory   





Rate   


 


Blood Pressure 207/79 207/79 137/70


 


O2 Sat by Pulse   





Oximetry   














  10/13/21





  14:21


 


Temperature 


 


Pulse Rate 


 


Respiratory 





Rate 


 


Blood Pressure 137/70


 


O2 Sat by Pulse 





Oximetry 











CBC and BMP: 


                                 10/11/21 20:35





                                 10/13/21 12:48


ABG, PT/INR, D-dimer: 


PT/INR, D-dimer











PT  13.6 Sec. (12.2-14.9)   10/12/21  05:57    


 


INR  0.98  (0.87-1.13)   10/12/21  05:57    


 


D-Dimer  774.12 ng/mlDDU (0-234)  H  10/11/21  20:35    








Abnormal lab findings: 


                                  Abnormal Labs











  10/11/21 10/11/21 10/11/21





  20:35 20:35 20:35


 


MCV  102 H  


 


MCH  34 H  


 


Lymph % (Auto)  11.7 L  


 


Baso % (Auto)  1.9 H  


 


Lymph # (Auto)  1.0 L  


 


Baso # (Auto)  0.2 H  


 


Seg Neutrophils %  81.0 H  


 


D-Dimer   774.12 H 


 


Carbon Dioxide   


 


BUN    29 H


 


Creatinine    1.3 H


 


Glucose    114 H


 


POC Glucose   


 


Lactate Dehydrogenase    207 H


 


C-Reactive Protein    9.00 H


 


Total Protein    8.3 H


 


Albumin   


 


Coronavirus (PCR)   














  10/11/21 10/11/21 10/12/21





  20:35 Unknown 05:57


 


MCV   


 


MCH   


 


Lymph % (Auto)   


 


Baso % (Auto)   


 


Lymph # (Auto)   


 


Baso # (Auto)   


 


Seg Neutrophils %   


 


D-Dimer   


 


Carbon Dioxide   


 


BUN    28 H


 


Creatinine   


 


Glucose  117 H   136 H


 


POC Glucose   


 


Lactate Dehydrogenase  216 H  


 


C-Reactive Protein  8.90 H  


 


Total Protein   


 


Albumin   


 


Coronavirus (PCR)   Positive A 














  10/12/21 10/12/21 10/12/21





  10:33 17:12 21:57


 


MCV   


 


MCH   


 


Lymph % (Auto)   


 


Baso % (Auto)   


 


Lymph # (Auto)   


 


Baso # (Auto)   


 


Seg Neutrophils %   


 


D-Dimer   


 


Carbon Dioxide   


 


BUN   


 


Creatinine   


 


Glucose   


 


POC Glucose  114 H  130 H  115 H


 


Lactate Dehydrogenase   


 


C-Reactive Protein   


 


Total Protein   


 


Albumin   


 


Coronavirus (PCR)   














  10/13/21





  12:48


 


MCV 


 


MCH 


 


Lymph % (Auto) 


 


Baso % (Auto) 


 


Lymph # (Auto) 


 


Baso # (Auto) 


 


Seg Neutrophils % 


 


D-Dimer 


 


Carbon Dioxide  20 L


 


BUN  30 H


 


Creatinine 


 


Glucose  139 H


 


POC Glucose 


 


Lactate Dehydrogenase 


 


C-Reactive Protein 


 


Total Protein 


 


Albumin  3.4 L


 


Coronavirus (PCR)

## 2021-10-13 NOTE — PROGRESS NOTE
Assessment and Plan





Cultures:


Blood culture no growth so far





A/P: 88-year-old female past medical history CKD, HTN, HLD admitted as Covid 

PUI.





#Covid-19: With shortness of breath, hypoxia.  Chest x-ray normal so far, 

awaiting Covid PCR.  She is fully vaccinated against Covid.





#CKD: Renally dose antibiotics.





#Acute hypoxic respiratory failure: O2 sats 88% on admission. Now on room air





Recs:


-Follow-up Covid PCR.  If positive start dexamethasone 6 mg every 24 hours


-Continue Levaquin for now given elevated procalcitonin.  Complete 5 days.


-Anticoagulation per hospital protocol


-No need to stay to compelte Remsdesivir. OK to DC from ID eprspective. ID will 

sign off. Please call with questions. 





Thank you for the consult, we will continue to follow.





DONAVAN Brown MD


[facility] ([facility])


O: [phone]


F: [phone]





Subjective


Date of service: 10/13/21


Interval history: 





Afebrile, normal white count.  On room air.  Covid positive.





Objective





- Exam


Narrative Exam: 





Physical exam deferred to reduce risk of transmission of COVID-19.  Please refer

to primary team's note.





- Constitutional


Vitals: 


                                   Vital Signs











Temp Pulse Resp BP Pulse Ox


 


 98.0 F   61   20   137/70   21 L


 


 10/13/21 05:13  10/13/21 05:13  10/13/21 05:13  10/13/21 14:21  10/13/21 09:22








                           Temperature -Last 24 Hours











Temperature                    98.0 F


 


Temperature                    97.9 F

















- Labs


CBC & Chem 7: 


                                 10/11/21 20:35





                                 10/13/21 12:48


Labs: 


                              Abnormal lab results











  10/12/21 10/12/21 10/13/21 Range/Units





  17:12 21:57 12:48 


 


Carbon Dioxide    20 L  (22-30)  mmol/L


 


BUN    30 H  (7-17)  mg/dL


 


Glucose    139 H  ()  mg/dL


 


POC Glucose  130 H  115 H   ()  mg/dL


 


Albumin    3.4 L  (3.9-5)  g/dL

## 2021-10-13 NOTE — PROGRESS NOTE
Assessment and Plan





-- Acute respiratory failure with hypoxia 


Possibly secondary to underlying bronchitis/pneumonia


Patient now on room air and positive for COVID-19


We will keep O2 saturation greater or equal to 92%.


Consult placed to pulmonology for evaluation.








--CARRIE, likely vasomotor nephropathy


Will monitor BUN/Creatinine.


Patient given some IV fluid.








-- 2019-nCoV infection


Positive for COVID-19 testing.


Patient placed on isolation precautions.


Consult placed to infectious disease for evaluation.


Initiate dexamethasone and remdesivir








-- HTN (hypertension), 


resumed routine home medications and monitor vital signs closely.


Adjust medications as needed to keep SBP less than 140








-- T2DM (type 2 diabetes mellitus)


Patient placed on sliding scale insulin. 


 We will monitor Accu-Cheks.








--DVT prophylaxis


Patient placed on subcutaneous heparin.








-- Full code status





Daily clinical course:


10/12/21: Pending Covid test, initiated on Covid protocol, adjust BP meds to 

keep SBP <140





10/13/21: Positive for COVID-19, initiated on dexamethasone.  Patient has 

underlying high risks medical condition which is associated with worse outcome 

for COVID 19.  We will also initiate remdesivir, ID consulted will await for re

commendation.  Continue to adjust BP meds.  Assess for home O2 requirement.











Subjective


Date of service: 10/13/21


Interval history: 





Patient seen and examined.  Medical records and medication list reviewed.  


No acute event overnight noted by the RN.  


Patient currently on room air.  Patient is tolerating diet.  


BP remains significantly elevated


Discussed plan of care at bedside with patient.








Objective





- Exam


Narrative Exam: 





Limited physical exam due to COVID-19 pandemic to minimize transmission of the 

disease and to preserve PPE.  Vital reviewed and stable.





GENERAL:  well-developed well-nourished elderly female lying on bed appeared to 

be in no discomfort. 


HEENT: Normocephalic.  Atraumatic. 


NECK: Supple.  


CHEST/LUNGS: breathing nonlabored. 


HEART/CARDIOVASCULAR: Heart rate stable on telemetry


ABDOMEN: Visibly not distended


SKIN: There is no rash 


NEURO:  No focal motor deficit.  Follows command.


MUSCULOSKELETAL: No joint effusion 


EXTRIMITY: No swelling, no cyanosis or clubbing.


PSYCH:  Cooperative.











- Constitutional


Vitals: 


                               Vital Signs - 12hr











  10/13/21 10/13/21 10/13/21





  14:21 17:11 21:00


 


Temperature  98.5 F 99.6 F


 


Pulse Rate  60 65


 


Respiratory  22 18





Rate   


 


Respiratory   





Rate [no pain]   


 


Blood Pressure 137/70 161/76 170/74


 


O2 Sat by Pulse  92 94





Oximetry   














  10/13/21 10/13/21 10/13/21





  22:00 22:23 23:00


 


Temperature   


 


Pulse Rate  65 


 


Respiratory   18





Rate   


 


Respiratory 18  





Rate [no pain]   


 


Blood Pressure  170/74 


 


O2 Sat by Pulse 94  94





Oximetry   














- Labs


CBC & Chem 7: 


                                 10/14/21 07:34





                                 10/14/21 07:34


Labs: 


                              Abnormal lab results











  10/13/21 10/13/21 10/13/21 Range/Units





  12:48 17:11 21:02 


 


Carbon Dioxide  20 L    (22-30)  mmol/L


 


BUN  30 H    (7-17)  mg/dL


 


Glucose  139 H    ()  mg/dL


 


POC Glucose   116 H  122 H  ()  mg/dL


 


Albumin  3.4 L    (3.9-5)  g/dL














HEART Score





- HEART Score


Troponin: 


                                        











Troponin T  < 0.010 ng/mL (0.00-0.029)   10/11/21  20:35

## 2021-10-14 LAB
ALBUMIN SERPL-MCNC: 3.3 G/DL (ref 3.9–5)
ALT SERPL-CCNC: 14 UNITS/L (ref 7–56)
BASOPHILS # (AUTO): 0 K/MM3 (ref 0–0.1)
BASOPHILS NFR BLD AUTO: 0.2 % (ref 0–1.8)
BUN SERPL-MCNC: 27 MG/DL (ref 7–17)
BUN/CREAT SERPL: 27 %
CALCIUM SERPL-MCNC: 9.3 MG/DL (ref 8.4–10.2)
EOSINOPHIL # BLD AUTO: 0 K/MM3 (ref 0–0.4)
EOSINOPHIL NFR BLD AUTO: 0 % (ref 0–4.3)
HCT VFR BLD CALC: 36.8 % (ref 30.3–42.9)
HEMOLYSIS INDEX: 9
HGB BLD-MCNC: 12.3 GM/DL (ref 10.1–14.3)
LYMPHOCYTES # BLD AUTO: 1.5 K/MM3 (ref 1.2–5.4)
LYMPHOCYTES NFR BLD AUTO: 20.3 % (ref 13.4–35)
MCHC RBC AUTO-ENTMCNC: 33 % (ref 30–34)
MCV RBC AUTO: 100 FL (ref 79–97)
MONOCYTES # (AUTO): 0.5 K/MM3 (ref 0–0.8)
MONOCYTES % (AUTO): 7.5 % (ref 0–7.3)
PLATELET # BLD: 168 K/MM3 (ref 140–440)
RBC # BLD AUTO: 3.69 M/MM3 (ref 3.65–5.03)

## 2021-10-14 RX ADMIN — METOPROLOL TARTRATE SCH MG: 50 TABLET, FILM COATED ORAL at 09:46

## 2021-10-14 RX ADMIN — DEXAMETHASONE SCH MG: 2 TABLET ORAL at 09:39

## 2021-10-14 RX ADMIN — HEPARIN SODIUM SCH UNIT: 5000 INJECTION, SOLUTION INTRAVENOUS; SUBCUTANEOUS at 21:06

## 2021-10-14 RX ADMIN — HEPARIN SODIUM SCH UNIT: 5000 INJECTION, SOLUTION INTRAVENOUS; SUBCUTANEOUS at 13:16

## 2021-10-14 RX ADMIN — INSULIN LISPRO SCH: 100 INJECTION, SOLUTION INTRAVENOUS; SUBCUTANEOUS at 21:15

## 2021-10-14 RX ADMIN — Medication SCH ML: at 21:17

## 2021-10-14 RX ADMIN — INSULIN LISPRO SCH UNIT: 100 INJECTION, SOLUTION INTRAVENOUS; SUBCUTANEOUS at 17:34

## 2021-10-14 RX ADMIN — PRAVASTATIN SODIUM SCH MG: 80 TABLET ORAL at 09:39

## 2021-10-14 RX ADMIN — Medication SCH UNIT: at 09:39

## 2021-10-14 RX ADMIN — SODIUM CHLORIDE SCH ML: 9 INJECTION, SOLUTION INTRAVENOUS at 20:53

## 2021-10-14 RX ADMIN — Medication SCH ML: at 09:48

## 2021-10-14 RX ADMIN — INSULIN LISPRO SCH: 100 INJECTION, SOLUTION INTRAVENOUS; SUBCUTANEOUS at 12:09

## 2021-10-14 RX ADMIN — HEPARIN SODIUM SCH UNIT: 5000 INJECTION, SOLUTION INTRAVENOUS; SUBCUTANEOUS at 05:25

## 2021-10-14 RX ADMIN — LOSARTAN POTASSIUM SCH MG: 50 TABLET, FILM COATED ORAL at 09:47

## 2021-10-14 RX ADMIN — INSULIN LISPRO SCH: 100 INJECTION, SOLUTION INTRAVENOUS; SUBCUTANEOUS at 09:17

## 2021-10-14 NOTE — DISCHARGE SUMMARY
Providers





- Providers


Date of Admission: 


10/13/21 08:43





Date of discharge: 10/14/21


Attending physician: 


ALEXIS URIBE





                                        





10/11/21 22:27


Consult to Dietitian/Nutrition [CONS] Routine 


   Physician Instructions: 


   Reason For Exam: 


   Reason for Consult: Diet education





10/12/21 04:06


Consult to Physician [CONS] Routine 


   Comment: 


   Consulting Provider: CHRISS SCHAEFFER


   Physician Instructions: 


   Reason For Exam: Acute hypoxic respiratory failure, R/O Covid -19





10/12/21 04:08


Consult to Physician [CONS] Routine 


   Comment: 


   Consulting Provider: NERY CHRIS


   Physician Instructions: 


   Reason For Exam: Acute hypoxic respiratory failure, R/O Covid -19











Primary care physician: 


PRIMARY CARE MD








Hospitalization


Condition: Good


Hospital course: 








88-year-old female with known history of chronic kidney disease, hyperlipidemia 

and hypertension presenting to the emergency room complaining of cough, 

shortness of breath and congestion over the past 3 days.  She has been fully 

vaccinated against COVID-19.


Upon arrival in the emergency room patient was slightly hypoxic with oxygen 

saturation of 88% on minimal exertion.


chest x-ray shows a tiny right pleural effusion, no obvious pulmonary 

infiltrates and no pneumothorax.


Labs were significant for elevated CRP of 8.9, elevated BUN and creatinine of 29

 and 1.3, procalcitonin 0.45


Patient initiated on empiric antibiotics, test for COVID-19 was positive


ID and pulmonology was consulted


ID recommended to continue antibiotic as procalcitonin was elevated greater than

 0.25 and to initiate dexamethasone, ID did not recommend to continue 

remdesivir.  Patient's renal function improved, BP medications were adjusted.  

Patient was then discharged home in stable condition with outpatient follow-up. 








Disposition: 01 HOME / SELF CARE / HOMELESS


Final Discharge Diagnosis (Prints w/discharge instructions): --Acute hypoxic 

respiratory failure, resolved.  --COVID-19 infection.  --Hypertension, 

uncontrolled.  --T2DM (type 2 diabetes mellitus).  --CARRIE, vasomotor nephropathy,

 resolved


Time spent for discharge: 34 minutes





Core Measure Documentation





- Palliative Care


Palliative Care/ Comfort Measures: Not Applicable





- Core Measures


Any of the following diagnoses?: none





Exam





- Physical Exam


Narrative exam: 





Limited physical exam due to COVID-19 pandemic to minimize transmission of the 

disease and to preserve PPE.  Vital reviewed and stable.





GENERAL:  well-developed well-nourished elderly female lying on bed appeared to 

be in no discomfort. 


HEENT: Normocephalic.  Atraumatic. 


NECK: Supple.  


CHEST/LUNGS: breathing nonlabored. 


HEART/CARDIOVASCULAR: Heart rate stable on telemetry


ABDOMEN: Visibly not distended


SKIN: There is no rash 


NEURO:  No focal motor deficit.  Follows command.


MUSCULOSKELETAL: No joint effusion 


EXTRIMITY: No swelling, no cyanosis or clubbing.


PSYCH:  Cooperative.











- Constitutional


Vitals: 


                                        











Temp Pulse Resp BP Pulse Ox


 


 98.5 F   65   19   144/72   92 


 


 10/14/21 11:21  10/14/21 13:34  10/14/21 11:21  10/14/21 13:34  10/14/21 11:21














Plan


Activity: advance as tolerated


Weight Bearing Status: Weight Bear as Tolerated


Diet: low fat, low salt


Special Instructions: record daily BP diary


Follow up with: 


PRIMARY CARE,MD [Primary Care Provider] - 3-5 Days


Prescriptions: 


amLODIPine 10 mg PO QDAY #30 tablet


hydrALAZINE [Apresoline TAB] 50 mg PO Q8HR #90 tablet


carvediloL [Coreg] 6.25 mg PO BID #60 tablet


dexAMETHasone [Decadron] 6 mg PO Q24HR #8 tablet


levoFLOXacin [Levaquin] 750 mg PO QDAY #5 tablet

## 2021-10-14 NOTE — PROGRESS NOTE
Assessment and Plan





89 y/o female with resolved respiratory failure, COVID positive, fully 

vaccinated











Steroid for 10 days


Prone, even at discharge


ID ok with discharge


Pulm wise ok with discharge.  Would recommend obtaining ambulatory sat to make 

sure she does not need oxygen therapy at home.





Subjective


Date of service: 10/14/21


Interval history: 





No acute events.





Objective


                               Vital Signs - 12hr











  10/13/21 10/14/21 10/14/21





  23:00 03:57 05:25


 


Temperature  97.7 F 


 


Pulse Rate  70 70


 


Respiratory 18 18 





Rate   


 


Blood Pressure  184/67 184/67


 


O2 Sat by Pulse 94 91 





Oximetry   














  10/14/21 10/14/21





  09:46 09:47


 


Temperature  


 


Pulse Rate 75 75


 


Respiratory  





Rate  


 


Blood Pressure 156/67 156/67


 


O2 Sat by Pulse  





Oximetry  











CBC and BMP: 


                                 10/14/21 07:34





                                 10/14/21 07:34


ABG, PT/INR, D-dimer: 


PT/INR, D-dimer











PT  13.6 Sec. (12.2-14.9)   10/12/21  05:57    


 


INR  0.98  (0.87-1.13)   10/12/21  05:57    


 


D-Dimer  774.12 ng/mlDDU (0-234)  H  10/11/21  20:35    








Abnormal lab findings: 


                                  Abnormal Labs











  10/11/21 10/11/21 10/11/21





  20:35 20:35 20:35


 


MCV  102 H  


 


MCH  34 H  


 


Lymph % (Auto)  11.7 L  


 


Mono % (Auto)   


 


Baso % (Auto)  1.9 H  


 


Lymph # (Auto)  1.0 L  


 


Baso # (Auto)  0.2 H  


 


Seg Neutrophils %  81.0 H  


 


D-Dimer   774.12 H 


 


Chloride   


 


Carbon Dioxide   


 


BUN    29 H


 


Creatinine    1.3 H


 


Glucose    114 H


 


POC Glucose   


 


Lactate Dehydrogenase    207 H


 


C-Reactive Protein    9.00 H


 


Total Protein    8.3 H


 


Albumin   


 


Coronavirus (PCR)   














  10/11/21 10/11/21 10/12/21





  20:35 Unknown 05:57


 


MCV   


 


MCH   


 


Lymph % (Auto)   


 


Mono % (Auto)   


 


Baso % (Auto)   


 


Lymph # (Auto)   


 


Baso # (Auto)   


 


Seg Neutrophils %   


 


D-Dimer   


 


Chloride   


 


Carbon Dioxide   


 


BUN    28 H


 


Creatinine   


 


Glucose  117 H   136 H


 


POC Glucose   


 


Lactate Dehydrogenase  216 H  


 


C-Reactive Protein  8.90 H  


 


Total Protein   


 


Albumin   


 


Coronavirus (PCR)   Positive A 














  10/12/21 10/12/21 10/12/21





  10:33 17:12 21:57


 


MCV   


 


MCH   


 


Lymph % (Auto)   


 


Mono % (Auto)   


 


Baso % (Auto)   


 


Lymph # (Auto)   


 


Baso # (Auto)   


 


Seg Neutrophils %   


 


D-Dimer   


 


Chloride   


 


Carbon Dioxide   


 


BUN   


 


Creatinine   


 


Glucose   


 


POC Glucose  114 H  130 H  115 H


 


Lactate Dehydrogenase   


 


C-Reactive Protein   


 


Total Protein   


 


Albumin   


 


Coronavirus (PCR)   














  10/13/21 10/13/21 10/13/21





  12:48 17:11 21:02


 


MCV   


 


MCH   


 


Lymph % (Auto)   


 


Mono % (Auto)   


 


Baso % (Auto)   


 


Lymph # (Auto)   


 


Baso # (Auto)   


 


Seg Neutrophils %   


 


D-Dimer   


 


Chloride   


 


Carbon Dioxide  20 L  


 


BUN  30 H  


 


Creatinine   


 


Glucose  139 H  


 


POC Glucose   116 H  122 H


 


Lactate Dehydrogenase   


 


C-Reactive Protein   


 


Total Protein   


 


Albumin  3.4 L  


 


Coronavirus (PCR)   














  10/14/21 10/14/21





  07:34 07:34


 


MCV   100 H


 


MCH   33 H


 


Lymph % (Auto)  


 


Mono % (Auto)   7.5 H


 


Baso % (Auto)  


 


Lymph # (Auto)  


 


Baso # (Auto)  


 


Seg Neutrophils %   72.0 H


 


D-Dimer  


 


Chloride  109.3 H 


 


Carbon Dioxide  


 


BUN  27 H 


 


Creatinine  


 


Glucose  


 


POC Glucose  


 


Lactate Dehydrogenase  


 


C-Reactive Protein  


 


Total Protein  


 


Albumin  3.3 L 


 


Coronavirus (PCR)

## 2021-10-15 VITALS — SYSTOLIC BLOOD PRESSURE: 160 MMHG | DIASTOLIC BLOOD PRESSURE: 71 MMHG

## 2021-10-15 LAB
ALBUMIN SERPL-MCNC: 3.2 G/DL (ref 3.9–5)
ALT SERPL-CCNC: 14 UNITS/L (ref 7–56)
BUN SERPL-MCNC: 21 MG/DL (ref 7–17)
BUN/CREAT SERPL: 23 %
CALCIUM SERPL-MCNC: 9.2 MG/DL (ref 8.4–10.2)
HEMOLYSIS INDEX: 7

## 2021-10-15 RX ADMIN — INSULIN LISPRO SCH: 100 INJECTION, SOLUTION INTRAVENOUS; SUBCUTANEOUS at 09:57

## 2021-10-15 RX ADMIN — Medication SCH ML: at 10:03

## 2021-10-15 RX ADMIN — HEPARIN SODIUM SCH UNIT: 5000 INJECTION, SOLUTION INTRAVENOUS; SUBCUTANEOUS at 05:27

## 2021-10-15 RX ADMIN — INSULIN LISPRO SCH: 100 INJECTION, SOLUTION INTRAVENOUS; SUBCUTANEOUS at 13:26

## 2021-10-15 RX ADMIN — PRAVASTATIN SODIUM SCH MG: 80 TABLET ORAL at 09:56

## 2021-10-15 RX ADMIN — LOSARTAN POTASSIUM SCH MG: 50 TABLET, FILM COATED ORAL at 09:56

## 2021-10-15 RX ADMIN — Medication SCH UNIT: at 09:56

## 2021-10-15 RX ADMIN — DEXAMETHASONE SCH MG: 2 TABLET ORAL at 09:58

## 2022-04-05 ENCOUNTER — HOSPITAL ENCOUNTER (EMERGENCY)
Dept: HOSPITAL 5 - ED | Age: 87
Discharge: HOME | End: 2022-04-05
Payer: MEDICARE

## 2022-04-05 VITALS — SYSTOLIC BLOOD PRESSURE: 160 MMHG | DIASTOLIC BLOOD PRESSURE: 92 MMHG

## 2022-04-05 DIAGNOSIS — J45.909: ICD-10-CM

## 2022-04-05 DIAGNOSIS — K57.92: ICD-10-CM

## 2022-04-05 DIAGNOSIS — Z91.09: ICD-10-CM

## 2022-04-05 DIAGNOSIS — R10.9: Primary | ICD-10-CM

## 2022-04-05 DIAGNOSIS — M19.90: ICD-10-CM

## 2022-04-05 DIAGNOSIS — I10: ICD-10-CM

## 2022-04-05 DIAGNOSIS — N20.0: ICD-10-CM

## 2022-04-05 DIAGNOSIS — E11.9: ICD-10-CM

## 2022-04-05 DIAGNOSIS — Z79.899: ICD-10-CM

## 2022-04-05 DIAGNOSIS — Z88.2: ICD-10-CM

## 2022-04-05 DIAGNOSIS — Z88.0: ICD-10-CM

## 2022-04-05 LAB
ALBUMIN SERPL-MCNC: 4.1 G/DL (ref 3.9–5)
ALT SERPL-CCNC: 14 UNITS/L (ref 7–56)
BASOPHILS # (AUTO): 0.1 K/MM3 (ref 0–0.1)
BASOPHILS NFR BLD AUTO: 0.6 % (ref 0–1.8)
BILIRUB DIRECT SERPL-MCNC: < 0.2 MG/DL (ref 0–0.2)
BILIRUB UR QL STRIP: (no result)
BLOOD UR QL VISUAL: (no result)
BUN SERPL-MCNC: 24 MG/DL (ref 7–17)
BUN/CREAT SERPL: 20 %
CALCIUM SERPL-MCNC: 10.5 MG/DL (ref 8.4–10.2)
EOSINOPHIL # BLD AUTO: 0 K/MM3 (ref 0–0.4)
EOSINOPHIL NFR BLD AUTO: 0.1 % (ref 0–4.3)
HCT VFR BLD CALC: 40.5 % (ref 30.3–42.9)
HEMOLYSIS INDEX: 10
HGB BLD-MCNC: 13.6 GM/DL (ref 10.1–14.3)
LYMPHOCYTES # BLD AUTO: 0.8 K/MM3 (ref 1.2–5.4)
LYMPHOCYTES NFR BLD AUTO: 8.2 % (ref 13.4–35)
MCHC RBC AUTO-ENTMCNC: 34 % (ref 30–34)
MCV RBC AUTO: 100 FL (ref 79–97)
MONOCYTES # (AUTO): 0.6 K/MM3 (ref 0–0.8)
MONOCYTES % (AUTO): 5.4 % (ref 0–7.3)
MUCOUS THREADS #/AREA URNS HPF: (no result) /HPF
PH UR STRIP: 6 [PH] (ref 5–7)
PLATELET # BLD: 237 K/MM3 (ref 140–440)
RBC # BLD AUTO: 4.04 M/MM3 (ref 3.65–5.03)
RBC #/AREA URNS HPF: 4 /HPF (ref 0–6)
UROBILINOGEN UR-MCNC: < 2 MG/DL (ref ?–2)
WBC #/AREA URNS HPF: 1 /HPF (ref 0–6)

## 2022-04-05 PROCEDURE — 99284 EMERGENCY DEPT VISIT MOD MDM: CPT

## 2022-04-05 PROCEDURE — 84484 ASSAY OF TROPONIN QUANT: CPT

## 2022-04-05 PROCEDURE — 80076 HEPATIC FUNCTION PANEL: CPT

## 2022-04-05 PROCEDURE — 96366 THER/PROPH/DIAG IV INF ADDON: CPT

## 2022-04-05 PROCEDURE — 96365 THER/PROPH/DIAG IV INF INIT: CPT

## 2022-04-05 PROCEDURE — 93005 ELECTROCARDIOGRAM TRACING: CPT

## 2022-04-05 PROCEDURE — 85025 COMPLETE CBC W/AUTO DIFF WBC: CPT

## 2022-04-05 PROCEDURE — 81001 URINALYSIS AUTO W/SCOPE: CPT

## 2022-04-05 PROCEDURE — 36415 COLL VENOUS BLD VENIPUNCTURE: CPT

## 2022-04-05 PROCEDURE — 80048 BASIC METABOLIC PNL TOTAL CA: CPT

## 2022-04-05 PROCEDURE — 74177 CT ABD & PELVIS W/CONTRAST: CPT

## 2022-04-05 PROCEDURE — 83690 ASSAY OF LIPASE: CPT

## 2022-04-05 PROCEDURE — 96375 TX/PRO/DX INJ NEW DRUG ADDON: CPT

## 2022-04-05 NOTE — CAT SCAN REPORT
CT abdomen pelvis w con 



INDICATION / CLINICAL INFORMATION:

abdominal pain.



TECHNIQUE:

CT abdomen pelvis following 100 mL Omnipaque 300 per routine. All CT scans at this location are perfo
rmed using CT dose reduction for ALARA by means of automated exposure control. 



COMPARISON:

None available.



FINDINGS:





Abdomen and pelvis: Moderate right hydroureteronephrosis secondary to an obstructive 5 mm calculus at
 the right ureterovesical junction.



There is a solid-appearing enhancing lesion within lower pole the left kidney measuring 3.3 cm in gerber
meter that is partially exophytic. Multiple cysts are seen throughout the left kidney and right kidne
y. Several calculi are scattered throughout both collecting systems



The liver contains several low-density lesions most of which are too small to actually characterize. 
There is a 4.3 cm peripherally and discontinuously enhancing lesion within the posterior hepatic segm
ent. The gallbladder, spleen, pancreas are unremarkable. The right adrenal gland is normal. The left 
adrenal gland contains multiple lesions some of which contain macroscopic fat. The largest left adren
al lesion measures 2.4 cm in diameter.



No free air or free fluid. No bowel obstruction. There is sigmoid diverticulosis. There is a small am
ount of inflammation identified within the mid sigmoid colon adjacent to a single diverticulum.



IMPRESSION:

1. Obstructive 5 mm calculus at the right ureterovesical junction.

2. Acute diverticulitis of the mid sigmoid colon.

3. Enhancing 3.3 cm mass arising from the lower pole the left kidney concerning for underlying neopla
sm.

4. Multiple enhancing lesions within the left adrenal gland some of which contain macroscopic fat pos
sibly related to mild lipomas. Comparison with remote prior exam suggested (unavailable at the time o
f dictation).



Signer Name: Pieter Alcantar MD 

Signed: 4/5/2022 7:27 PM

Workstation Name: [id number]

## 2022-04-05 NOTE — EMERGENCY DEPARTMENT REPORT
ED Abdominal Pain HPI





- General


Chief Complaint: Abdominal Pain


Stated Complaint: RIGHT SIDE PAIN


Time Seen by Provider: 04/05/22 16:25


Source: patient


Mode of arrival: Ambulatory


Limitations: No Limitations





- History of Present Illness


Initial Comments: 





Patient is 89 years old female with history of hypertension, diabetes, arthritis

and kidney stone.  Patient presented to the ER complaining of right abdominal 

pain that started all of a sudden this afternoon.  Patient described her pain as

sharp and burning sensation.  Patient stated that she is nauseated but no 

vomiting.  Patient denied any fever or chills.  No chest pain or shortness of 

breath.


MD Complaint: abdominal pain


-: This afternoon


Location: diffuse


Radiation: none


Migration to: no migration


Severity: moderate


Associated Symptoms: nausea, vomiting





- Related Data


                                Home Medications











 Medication  Instructions  Recorded  Confirmed  Last Taken


 


Simvastatin 40 mg PO QDAY 08/26/14 10/12/21 10/12/21 09:00


 


Cholecalciferol (Vitamin D3) 2,000 unit PO QDAY 02/23/21 10/12/21 10/12/21 09:00





[Vitamin D3 2,000 UNIT CAP]    


 


Losartan [Cozaar] 100 mg PO QDAY 02/23/21 10/12/21 10/12/21 09:00








                                  Previous Rx's











 Medication  Instructions  Recorded  Last Taken  Type


 


amLODIPine 10 mg PO QDAY #30 tablet 10/14/21 Unknown Rx


 


carvediloL [Coreg] 6.25 mg PO BID #60 tablet 10/14/21 Unknown Rx


 


dexAMETHasone [Decadron] 6 mg PO Q24HR #8 tablet 10/14/21 Unknown Rx


 


hydrALAZINE [Apresoline TAB] 50 mg PO Q8HR #90 tablet 10/14/21 Unknown Rx


 


levoFLOXacin [Levaquin] 750 mg PO QDAY #5 tablet 10/14/21 Unknown Rx











                                    Allergies











Allergy/AdvReac Type Severity Reaction Status Date / Time


 


Penicillins Allergy  Swelling Verified 04/05/22 16:15


 


Sulfa (Sulfonamide Allergy  Rash Verified 04/05/22 16:15





Antibiotics)     


 


tramadol Allergy  Rash Verified 04/05/22 16:15














ED Review of Systems


ROS: 


Stated complaint: RIGHT SIDE PAIN


Other details as noted in HPI





Comment: All other systems reviewed and negative


Constitutional: denies: chills, fever


Respiratory: denies: cough, shortness of breath, SOB with exertion, SOB at rest


Cardiovascular: denies: chest pain, palpitations


Gastrointestinal: abdominal pain, nausea.  denies: vomiting, diarrhea, 

constipation, hematemesis, melena, hematochezia


Musculoskeletal: denies: back pain


Neurological: denies: headache, weakness, numbness, paresthesias, confusion, 

abnormal gait





ED Past Medical Hx





- Past Medical History


Hx Hypertension: Yes


Hx Heart Attack/AMI: No


Hx Diabetes: Yes (per patient pre diabetes)


Hx Arthritis: Yes


Hx Kidney Stones: Yes


Hx Asthma: Yes


Additional medical history: high cholesterol, cyst on kidney





- Surgical History


Additional Surgical History: Hysterectomy, thyroid surgery, surgery to left toe,

left foot, left elbow, left hand, intra Ductal Papilloma, Dysplasia under 

tongue, cataract surgery, vein stripping, parathyroidectomy





- Social History


Smoking Status: Never Smoker





- Medications


Home Medications: 


                                Home Medications











 Medication  Instructions  Recorded  Confirmed  Last Taken  Type


 


Simvastatin 40 mg PO QDAY 08/26/14 10/12/21 10/12/21 09:00 History


 


Cholecalciferol (Vitamin D3) 2,000 unit PO QDAY 02/23/21 10/12/21 10/12/21 09:00

History





[Vitamin D3 2,000 UNIT CAP]     


 


Losartan [Cozaar] 100 mg PO QDAY 02/23/21 10/12/21 10/12/21 09:00 History


 


amLODIPine 10 mg PO QDAY #30 tablet 10/14/21  Unknown Rx


 


carvediloL [Coreg] 6.25 mg PO BID #60 tablet 10/14/21  Unknown Rx


 


dexAMETHasone [Decadron] 6 mg PO Q24HR #8 tablet 10/14/21  Unknown Rx


 


hydrALAZINE [Apresoline TAB] 50 mg PO Q8HR #90 tablet 10/14/21  Unknown Rx


 


levoFLOXacin [Levaquin] 750 mg PO QDAY #5 tablet 10/14/21  Unknown Rx














ED Physical Exam





- General


Limitations: No Limitations


General appearance: alert, in no apparent distress





- Head


Head exam: Present: atraumatic, normocephalic, normal inspection





- Eye


Eye exam: Present: normal appearance





- ENT


ENT exam: Present: normal exam, normal orophraynx, mucous membranes moist





- Neck


Neck exam: Present: normal inspection, full ROM.  Absent: tenderness, 

meningismus





- Respiratory


Respiratory exam: Present: normal lung sounds bilaterally





- Cardiovascular


Cardiovascular Exam: Present: regular rate, normal rhythm, systolic murmur





- GI/Abdominal


GI/Abdominal exam: Present: soft, tenderness, normal bowel sounds.  Absent: 

distended, guarding, rebound, rigid, organomegaly, mass, bruit, pulsatile mass, 

hernia





- Extremities Exam


Extremities exam: Present: normal inspection, full ROM, normal capillary refill.

  Absent: tenderness





- Back Exam


Back exam: Present: normal inspection, full ROM.  Absent: CVA tenderness (R), 

CVA tenderness (L)





- Neurological Exam


Neurological exam: Present: alert, oriented X3, CN II-XII intact, normal gait.  

Absent: motor sensory deficit





- Psychiatric


Psychiatric exam: Present: normal mood





- Skin


Skin exam: Present: warm, intact, normal color





ED Medical Decision Making





- Lab Data


Result diagrams: 


                                 04/05/22 16:50





                                 04/05/22 16:50





- Radiology Data


Radiology results: report reviewed





- Medical Decision Making





Patient is 89 years old female with history of hypertension, diabetes, arthritis

 and kidney stone.  Patient presented to the ER complaining of right abdominal 

pain that started all of a sudden this afternoon.  Patient described her pain as

 sharp and burning sensation.  Patient stated that she is nauseated but no 

vomiting.  Patient denied any fever or chills.  No chest pain or shortness of 

breath.





Patient received morphine, Zofran and fentanyl for pain.  Patient also received 

normal saline.  CT abdomen and pelvis with IV contrast showed a 5 mm obstructing

 ureterovesical calculus.  It also reported that a left sigmoid diverticulitis. 

 Patient received Levaquin.  Patient stated that her pain is much better.  

Patient strongly advised to follow-up with urologist in the next 2 to 3 days and

 to return to the ER if he develop any new symptoms.





Critical care attestation.: 


If time is entered above; I have spent that time in minutes in the direct care 

of this critically ill patient, excluding procedure time.








ED Disposition


Clinical Impression: 


 Acute abdominal pain, Ureteric colic, Kidney stone, Diverticulitis





Disposition: 01 HOME / SELF CARE / HOMELESS


Is pt being admited?: No


Condition: Stable


Instructions:  Abdominal Pain (ED), Kidney Stones, Easy-to-Read, Abdominal Pain,

 Adult, Diverticulitis


Referrals: 


TANO BOSE MD [Staff Physician] - 3-5 Days

## 2022-04-07 NOTE — ELECTROCARDIOGRAPH REPORT
[facility]

                                       

Test Date:    2022               Test Time:    16:18:32

Pat Name:     CHARLY WHITE            Department:   

Patient ID:   [id number]          Room:          

Gender:       F                        Technician:   JAVI

:          [id number]               Requested By: BRANDEN AMOS

Order Number: [id number]              Reading MD:   Odilon Patton

                                 Measurements

Intervals                              Axis          

Rate:         79                       P:            92

MA:           211                      QRS:          52

QRSD:         93                       T:            25

QT:           419                                    

QTc:          481                                    

                           Interpretive Statements

Sinus rhythm

Multiple premature complexes, vent & supraven

Compared to ECG 10/11/2021 21:54:50

No significant changes

Electronically Signed On 2022 10:21:20 EDT by Odilon Patton

## 2022-08-11 ENCOUNTER — HOSPITAL ENCOUNTER (OUTPATIENT)
Dept: HOSPITAL 5 - MAMMO | Age: 87
Discharge: HOME | End: 2022-08-11
Attending: SURGERY
Payer: MEDICARE

## 2022-08-11 DIAGNOSIS — Z12.31: Primary | ICD-10-CM

## 2022-08-11 PROCEDURE — 77067 SCR MAMMO BI INCL CAD: CPT

## 2022-08-11 NOTE — MAMMOGRAPHY REPORT
DIGITAL SCREENING MAMMOGRAM WITH CAD, 8/11/2022



CLINICAL INFORMATION / INDICATION: Routine screening mammography. SCREENING



TECHNIQUE:  Digital bilateral 2D mammography was obtained in the craniocaudal and mediolateral obliqu
e projections. This examination was interpreted with the benefit of Computer-Aided Detection analysis
.



COMPARISON: 7/9/2021 and 7/6/2020



FINDINGS: 



Breast Density: There are scattered areas of fibroglandular density.



No dominant mass, suspicious calcifications, or architectural distortion in either breast. 



Stable bilateral nodules/lymph nodes.

 

IMPRESSION: No mammographic evidence of malignancy.



Follow up recommendation: Routine yearly screening mammogram.



BI-RADS Category 2:  BENIGN.





-------------------------------------------------------------------------------------------

A "normal" or negative report should not discourage follow up or biopsy of a clinically significant f
inding.



A written summary of these findings will be mailed to the patient. The patient will be entered into a
 mammography reporting system which will generate a reminder letter for the patient's next appointmen
t at the appropriate interval.



The [employer] College [employer] recommends yearly mammograms starting at age 40 and continuing as l
woodrow as a woman is in good health.  Breast MRI is recommended for women with an approximate 20-25% or 
greater lifetime risk of breast cancer, including women with a strong family history of breast or ova
bessy cancer or who have been treated for Hodgkin's disease.



Signer Name: Porfirio Salazar MD 

Signed: 8/11/2022 4:14 PM

Workstation Name: [id number]

## 2024-10-10 NOTE — PROGRESS NOTE
Assessment and Plan





87 y/o female with resolved respiratory failure, COVID positive, fully 

vaccinated





10/15/21:  no new recs for today.  Pulm wise stable.  Will sign off.











Steroid for 10 days


Prone, even at discharge


ID ok with discharge


Pulm wise ok with discharge.  Would recommend obtaining ambulatory sat to make 

sure she does not need oxygen therapy at home.





Subjective


Date of service: 10/15/21


Interval history: 





IMS held discharge yesterday secondary to blood pressure.  Weaned to room air.





Objective


                               Vital Signs - 12hr











  10/14/21 10/14/21 10/14/21





  21:02 21:06 22:00


 


Temperature 98.0 F  


 


Pulse Rate 56 L 56 L 


 


Respiratory 19  





Rate   


 


Blood Pressure 151/75 151/75 


 


O2 Sat by Pulse 95  92





Oximetry   














  10/14/21 10/14/21 10/15/21





  22:51 23:00 04:35


 


Temperature   98.6 F


 


Pulse Rate 66  64


 


Respiratory  17 16





Rate   


 


Blood Pressure 151/75  180/76


 


O2 Sat by Pulse  94 93





Oximetry   














  10/15/21





  05:28


 


Temperature 


 


Pulse Rate 64


 


Respiratory 





Rate 


 


Blood Pressure 180/76


 


O2 Sat by Pulse 





Oximetry 











CBC and BMP: 


                                 10/14/21 07:34





                                 10/15/21 06:45


ABG, PT/INR, D-dimer: 


PT/INR, D-dimer











PT  13.6 Sec. (12.2-14.9)   10/12/21  05:57    


 


INR  0.98  (0.87-1.13)   10/12/21  05:57    


 


D-Dimer  774.12 ng/mlDDU (0-234)  H  10/11/21  20:35    








Abnormal lab findings: 


                                  Abnormal Labs











  10/11/21 10/11/21 10/11/21





  20:35 20:35 20:35


 


MCV  102 H  


 


MCH  34 H  


 


Lymph % (Auto)  11.7 L  


 


Mono % (Auto)   


 


Baso % (Auto)  1.9 H  


 


Lymph # (Auto)  1.0 L  


 


Baso # (Auto)  0.2 H  


 


Seg Neutrophils %  81.0 H  


 


D-Dimer   774.12 H 


 


Chloride   


 


Carbon Dioxide   


 


BUN    29 H


 


Creatinine    1.3 H


 


Glucose    114 H


 


POC Glucose   


 


Lactate Dehydrogenase    207 H


 


C-Reactive Protein    9.00 H


 


Total Protein    8.3 H


 


Albumin   


 


Coronavirus (PCR)   














  10/11/21 10/11/21 10/12/21





  20:35 Unknown 05:57


 


MCV   


 


MCH   


 


Lymph % (Auto)   


 


Mono % (Auto)   


 


Baso % (Auto)   


 


Lymph # (Auto)   


 


Baso # (Auto)   


 


Seg Neutrophils %   


 


D-Dimer   


 


Chloride   


 


Carbon Dioxide   


 


BUN    28 H


 


Creatinine   


 


Glucose  117 H   136 H


 


POC Glucose   


 


Lactate Dehydrogenase  216 H  


 


C-Reactive Protein  8.90 H  


 


Total Protein   


 


Albumin   


 


Coronavirus (PCR)   Positive A 














  10/12/21 10/12/21 10/12/21





  10:33 17:12 21:57


 


MCV   


 


MCH   


 


Lymph % (Auto)   


 


Mono % (Auto)   


 


Baso % (Auto)   


 


Lymph # (Auto)   


 


Baso # (Auto)   


 


Seg Neutrophils %   


 


D-Dimer   


 


Chloride   


 


Carbon Dioxide   


 


BUN   


 


Creatinine   


 


Glucose   


 


POC Glucose  114 H  130 H  115 H


 


Lactate Dehydrogenase   


 


C-Reactive Protein   


 


Total Protein   


 


Albumin   


 


Coronavirus (PCR)   














  10/13/21 10/13/21 10/13/21





  12:48 17:11 21:02


 


MCV   


 


MCH   


 


Lymph % (Auto)   


 


Mono % (Auto)   


 


Baso % (Auto)   


 


Lymph # (Auto)   


 


Baso # (Auto)   


 


Seg Neutrophils %   


 


D-Dimer   


 


Chloride   


 


Carbon Dioxide  20 L  


 


BUN  30 H  


 


Creatinine   


 


Glucose  139 H  


 


POC Glucose   116 H  122 H


 


Lactate Dehydrogenase   


 


C-Reactive Protein   


 


Total Protein   


 


Albumin  3.4 L  


 


Coronavirus (PCR)   














  10/14/21 10/14/21 10/14/21





  07:34 07:34 11:21


 


MCV   100 H 


 


MCH   33 H 


 


Lymph % (Auto)   


 


Mono % (Auto)   7.5 H 


 


Baso % (Auto)   


 


Lymph # (Auto)   


 


Baso # (Auto)   


 


Seg Neutrophils %   72.0 H 


 


D-Dimer   


 


Chloride  109.3 H  


 


Carbon Dioxide   


 


BUN  27 H  


 


Creatinine   


 


Glucose   


 


POC Glucose    111 H


 


Lactate Dehydrogenase   


 


C-Reactive Protein   


 


Total Protein   


 


Albumin  3.3 L  


 


Coronavirus (PCR)   














  10/14/21 10/14/21 10/15/21





  15:42 21:06 06:45


 


MCV   


 


MCH   


 


Lymph % (Auto)   


 


Mono % (Auto)   


 


Baso % (Auto)   


 


Lymph # (Auto)   


 


Baso # (Auto)   


 


Seg Neutrophils %   


 


D-Dimer   


 


Chloride    108.4 H


 


Carbon Dioxide   


 


BUN    21 H


 


Creatinine   


 


Glucose   


 


POC Glucose  184 H  110 H 


 


Lactate Dehydrogenase   


 


C-Reactive Protein   


 


Total Protein   


 


Albumin    3.2 L


 


Coronavirus (PCR) Treatment Number (Will Not Render If 0): 2 Detail Level: Detailed Anesthesia Volume In Cc: 0 Render Note In Bullet Format When Appropriate: Yes Add 52 Modifier (Optional): no Consent: The patient's consent was obtained including but not limited to risks of crusting, scabbing, blistering, scarring, darker or lighter pigmentary change, recurrence, incomplete removal and infection. Duration Of Freeze Thaw-Cycle (Seconds): 5-10 Medical Necessity Information: It is in your best interest to select a reason for this procedure from the list below. All of these items fulfill various CMS LCD requirements except the new and changing color options. Medical Necessity Clause: This procedure was medically necessary because the lesions that were treated were: Number Of Freeze-Thaw Cycles: 2 freeze-thaw cycles Post-Care Instructions: I reviewed with the patient in detail post-care instructions. Patient is to wear sunprotection, and avoid picking at any of the treated lesions. Pt may apply Vaseline to crusted or scabbing areas.